# Patient Record
Sex: MALE | Race: WHITE | Employment: UNEMPLOYED | RURAL
[De-identification: names, ages, dates, MRNs, and addresses within clinical notes are randomized per-mention and may not be internally consistent; named-entity substitution may affect disease eponyms.]

---

## 2018-03-29 ENCOUNTER — HOSPITAL ENCOUNTER (OUTPATIENT)
Dept: OTHER | Age: 58
Discharge: HOME | End: 2018-03-29
Attending: NURSE PRACTITIONER | Admitting: NURSE PRACTITIONER

## 2018-03-29 DIAGNOSIS — R52 PAIN: ICD-10-CM

## 2018-04-03 ENCOUNTER — HOSPITAL ENCOUNTER (OUTPATIENT)
Dept: OTHER | Age: 58
Discharge: HOME | End: 2018-04-03
Attending: NURSE PRACTITIONER | Admitting: NURSE PRACTITIONER

## 2018-04-03 ENCOUNTER — HOSPITAL ENCOUNTER (OUTPATIENT)
Dept: GENERAL RADIOLOGY | Age: 58
Discharge: HOME | End: 2018-04-03

## 2018-04-03 DIAGNOSIS — Z87.891 PERSONAL HISTORY OF SMOKING: ICD-10-CM

## 2018-04-03 DIAGNOSIS — Z87.891 PERSONAL HISTORY OF NICOTINE DEPENDENCE: ICD-10-CM

## 2018-04-10 ENCOUNTER — HOSPITAL ENCOUNTER (OUTPATIENT)
Dept: OTHER | Age: 58
Discharge: HOME | End: 2018-04-30
Attending: NURSE PRACTITIONER | Admitting: NURSE PRACTITIONER

## 2018-04-10 ENCOUNTER — HOSPITAL ENCOUNTER (OUTPATIENT)
Dept: PHYSICAL THERAPY | Age: 58
Discharge: HOME | End: 2018-04-10
Attending: NURSE PRACTITIONER | Admitting: NURSE PRACTITIONER

## 2018-04-10 ASSESSMENT — PAIN DESCRIPTION - PAIN TYPE: TYPE: ACUTE PAIN;CHRONIC PAIN

## 2018-04-10 ASSESSMENT — PAIN DESCRIPTION - PROGRESSION: CLINICAL_PROGRESSION: GRADUALLY WORSENING

## 2018-04-10 ASSESSMENT — PAIN DESCRIPTION - ORIENTATION: ORIENTATION: RIGHT;LEFT

## 2018-04-10 ASSESSMENT — PAIN DESCRIPTION - LOCATION: LOCATION: BACK

## 2018-04-10 ASSESSMENT — PAIN DESCRIPTION - FREQUENCY: FREQUENCY: INTERMITTENT

## 2018-04-10 ASSESSMENT — PAIN SCALES - GENERAL: PAINLEVEL_OUTOF10: 3

## 2018-04-10 ASSESSMENT — PAIN DESCRIPTION - ONSET: ONSET: ON-GOING

## 2018-04-11 ASSESSMENT — PAIN DESCRIPTION - PROGRESSION: CLINICAL_PROGRESSION: GRADUALLY WORSENING

## 2018-04-12 ENCOUNTER — HOSPITAL ENCOUNTER (OUTPATIENT)
Dept: PHYSICAL THERAPY | Age: 58
Discharge: HOME | End: 2018-04-15
Admitting: NURSE PRACTITIONER

## 2018-04-17 ENCOUNTER — HOSPITAL ENCOUNTER (OUTPATIENT)
Dept: PHYSICAL THERAPY | Age: 58
Discharge: HOME | End: 2018-04-20
Admitting: NURSE PRACTITIONER

## 2018-04-19 ENCOUNTER — HOSPITAL ENCOUNTER (OUTPATIENT)
Dept: PHYSICAL THERAPY | Age: 58
Discharge: HOME | End: 2018-04-22
Admitting: NURSE PRACTITIONER

## 2018-05-01 ENCOUNTER — HOSPITAL ENCOUNTER (OUTPATIENT)
Dept: OTHER | Age: 58
Discharge: HOME | End: 2018-05-31
Attending: NURSE PRACTITIONER | Admitting: NURSE PRACTITIONER

## 2019-07-09 ENCOUNTER — HOSPITAL ENCOUNTER (OUTPATIENT)
Dept: GENERAL RADIOLOGY | Facility: HOSPITAL | Age: 59
Discharge: HOME | End: 2019-07-09
Payer: COMMERCIAL

## 2019-07-09 ENCOUNTER — HOSPITAL ENCOUNTER (OUTPATIENT)
Facility: HOSPITAL | Age: 59
Discharge: HOME | End: 2019-07-09
Payer: COMMERCIAL

## 2019-07-09 DIAGNOSIS — M25.572 LEFT ANKLE PAIN, UNSPECIFIED CHRONICITY: ICD-10-CM

## 2019-07-09 PROCEDURE — 73610 X-RAY EXAM OF ANKLE: CPT

## 2019-07-17 ENCOUNTER — HOSPITAL ENCOUNTER (OUTPATIENT)
Dept: CT IMAGING | Facility: HOSPITAL | Age: 59
Discharge: HOME | End: 2019-07-17
Payer: COMMERCIAL

## 2019-07-17 DIAGNOSIS — Z72.0 TOBACCO ABUSE: ICD-10-CM

## 2019-07-17 PROCEDURE — G0297 LDCT FOR LUNG CA SCREEN: HCPCS

## 2019-09-05 ENCOUNTER — HOSPITAL ENCOUNTER (OUTPATIENT)
Dept: GENERAL RADIOLOGY | Facility: HOSPITAL | Age: 59
Discharge: HOME | End: 2019-09-05
Payer: COMMERCIAL

## 2019-09-05 ENCOUNTER — HOSPITAL ENCOUNTER (OUTPATIENT)
Facility: HOSPITAL | Age: 59
Discharge: HOME | End: 2019-09-05
Payer: COMMERCIAL

## 2019-09-05 DIAGNOSIS — G89.29 CHRONIC PAIN OF RIGHT KNEE: ICD-10-CM

## 2019-09-05 DIAGNOSIS — M25.561 CHRONIC PAIN OF RIGHT KNEE: ICD-10-CM

## 2019-09-05 PROCEDURE — 73562 X-RAY EXAM OF KNEE 3: CPT

## 2020-01-30 ENCOUNTER — TELEPHONE (OUTPATIENT)
Dept: SURGERY | Age: 60
End: 2020-01-30

## 2020-02-03 ENCOUNTER — PROCEDURE VISIT (OUTPATIENT)
Dept: SURGERY | Age: 60
End: 2020-02-03
Payer: COMMERCIAL

## 2020-02-03 ENCOUNTER — OFFICE VISIT (OUTPATIENT)
Dept: SURGERY | Age: 60
End: 2020-02-03
Payer: COMMERCIAL

## 2020-02-03 VITALS
BODY MASS INDEX: 42.66 KG/M2 | SYSTOLIC BLOOD PRESSURE: 145 MMHG | HEIGHT: 72 IN | DIASTOLIC BLOOD PRESSURE: 84 MMHG | HEART RATE: 102 BPM | WEIGHT: 315 LBS | RESPIRATION RATE: 18 BRPM | TEMPERATURE: 97.9 F

## 2020-02-03 PROCEDURE — 99213 OFFICE O/P EST LOW 20 MIN: CPT

## 2020-02-03 PROCEDURE — 11402 EXC TR-EXT B9+MARG 1.1-2 CM: CPT | Performed by: SURGERY

## 2020-02-03 PROCEDURE — 12031 INTMD RPR S/A/T/EXT 2.5 CM/<: CPT | Performed by: SURGERY

## 2020-02-03 PROCEDURE — 99243 OFF/OP CNSLTJ NEW/EST LOW 30: CPT | Performed by: SURGERY

## 2020-02-03 PROCEDURE — 99203 OFFICE O/P NEW LOW 30 MIN: CPT

## 2020-02-03 RX ORDER — RANITIDINE 300 MG/1
300 CAPSULE ORAL EVERY EVENING
COMMUNITY

## 2020-02-03 RX ORDER — PRAVASTATIN SODIUM 20 MG
20 TABLET ORAL DAILY
COMMUNITY

## 2020-02-03 RX ORDER — POTASSIUM CHLORIDE 1.5 G/1.77G
20 POWDER, FOR SOLUTION ORAL DAILY
COMMUNITY

## 2020-02-03 RX ORDER — CELECOXIB 100 MG/1
100 CAPSULE ORAL 2 TIMES DAILY
COMMUNITY

## 2020-02-03 RX ORDER — CHLORTHALIDONE 25 MG/1
25 TABLET ORAL DAILY
COMMUNITY

## 2020-02-03 RX ORDER — LOSARTAN POTASSIUM 100 MG/1
100 TABLET ORAL DAILY
COMMUNITY

## 2020-02-03 RX ORDER — AMLODIPINE BESYLATE 10 MG/1
10 TABLET ORAL DAILY
COMMUNITY

## 2020-02-03 RX ORDER — TIZANIDINE 4 MG/1
4 TABLET ORAL 2 TIMES DAILY
COMMUNITY

## 2020-02-03 SDOH — HEALTH STABILITY: MENTAL HEALTH: HOW OFTEN DO YOU HAVE A DRINK CONTAINING ALCOHOL?: NEVER

## 2020-02-03 NOTE — PROGRESS NOTES
Konstantin Coleman was brought to operating suite and after appropriate consent was obtained from the patient. The right buttock was prepped and draped in normal sterile fashion. The 1.2cm skin lesion was circumferentially infiltrated with 1% xylocaine plain. After sufficient local anesthesia was obtained, the lesion was elliptically excised using 15 blade and submitted to pathology. Layer closure was performed using 3-0 vicryl interrupted horizontal mattress sutures for the subcutaneous tissue. Skin closure was performed using  3-0 nylon interrupted vertical mattress sutures. Sterile dressing was placed and the patient was instructed with respect to wound care and activities. The patient will return in one week for suture removal and path report.     Electronically signed by Dave Deluca MD on 2/3/2020 at 10:35 AM

## 2020-02-03 NOTE — PROGRESS NOTES
use.  DRUGS:   reports no history of drug use. MARITAL STATUS:    OCCUPATION:    Family History:       Problem Relation Age of Onset    Cancer Mother         breast    High Blood Pressure Mother     High Blood Pressure Father        REVIEW OF SYSTEMS:    CONSTITUTIONAL:  negative  EYES:  negative  HEENT:  negative  RESPIRATORY:  negative  CARDIOVASCULAR:  negative  GASTROINTESTINAL:  negative  GENITOURINARY:  negative  INTEGUMENT/BREAST:  negative except for above  HEMATOLOGIC/LYMPHATIC:  negative  ALLERGIC/IMMUNOLOGIC:  negative  ENDOCRINE:  negative  MUSCULOSKELETAL:  negative  NEUROLOGICAL:  negative  BEHAVIOR/PSYCH:  negative    PHYSICAL EXAM:    VITALS:  BP (!) 145/84 (Site: Left Upper Arm, Position: Sitting, Cuff Size: Large Adult)   Pulse 102   Temp 97.9 °F (36.6 °C) (Oral)   Resp 18   Ht 6' (1.829 m)   Wt (!) 352 lb 12.8 oz (160 kg)   BMI 47.85 kg/m²   CONSTITUTIONAL:  awake, alert, cooperative, no apparent distress, and appears stated age  SKIN:  Right buttock 1.2cm pedunculated skin lesion near buttock cleft without erythema or tenderness, no bruising or bleeding, normal skin color, texture, turgor and no redness, warmth, or swelling  DATA:    Old records have been requested      IMPRESSION/RECOMMENDATIONS:  Skin lesion right buttock    Plan: For excision in office under local anesthesia. Consult faxed to [facility], [address] [place] [address]; Thank you for the opportunity to participate in this patient's care.   Electronically signed by Johnny Ford MD on 2/3/2020 at 9:50 AM

## 2020-02-12 ENCOUNTER — OFFICE VISIT (OUTPATIENT)
Dept: SURGERY | Age: 60
End: 2020-02-12
Payer: COMMERCIAL

## 2020-02-12 VITALS
WEIGHT: 315 LBS | DIASTOLIC BLOOD PRESSURE: 74 MMHG | TEMPERATURE: 98.7 F | RESPIRATION RATE: 18 BRPM | HEIGHT: 72 IN | HEART RATE: 96 BPM | BODY MASS INDEX: 42.66 KG/M2 | SYSTOLIC BLOOD PRESSURE: 139 MMHG

## 2020-02-12 PROCEDURE — 99024 POSTOP FOLLOW-UP VISIT: CPT | Performed by: SURGERY

## 2020-02-12 NOTE — PROGRESS NOTES
Pt is coming in today to have his sutures removed after having a skin lesion excised last week. Pt states nothing has changed with his medications or history since he was here last and he had no complications after the procedure.

## 2020-02-12 NOTE — PROGRESS NOTES
The patient comes in for followup from excision of skin lesion . There have been no issues with inflammation or bleeding. Skin sutures were removed. Pathology report shows benign melanocytic nevus. The patient is to return to see me on an  as needed basis and will followup with PCP for any continuing medical problems.     Electronically signed by Frantz Chamberlain MD on 2/12/2020 at 11:41 AM

## 2020-09-04 ENCOUNTER — HOSPITAL ENCOUNTER (OUTPATIENT)
Dept: CT IMAGING | Facility: HOSPITAL | Age: 60
Discharge: HOME | End: 2020-09-04
Payer: COMMERCIAL

## 2020-09-04 PROCEDURE — G0297 LDCT FOR LUNG CA SCREEN: HCPCS

## 2021-03-04 ENCOUNTER — HOSPITAL ENCOUNTER (OUTPATIENT)
Dept: GENERAL RADIOLOGY | Facility: HOSPITAL | Age: 61
Discharge: HOME | End: 2021-03-04
Payer: COMMERCIAL

## 2021-03-04 ENCOUNTER — HOSPITAL ENCOUNTER (OUTPATIENT)
Facility: HOSPITAL | Age: 61
Discharge: HOME | End: 2021-03-04
Payer: COMMERCIAL

## 2021-03-04 DIAGNOSIS — G89.29 CHRONIC PAIN OF RIGHT KNEE: ICD-10-CM

## 2021-03-04 DIAGNOSIS — M25.561 CHRONIC PAIN OF RIGHT KNEE: ICD-10-CM

## 2021-03-04 PROCEDURE — 73562 X-RAY EXAM OF KNEE 3: CPT

## 2021-04-07 ENCOUNTER — HOSPITAL ENCOUNTER (OUTPATIENT)
Dept: PHYSICAL THERAPY | Facility: HOSPITAL | Age: 61
Setting detail: THERAPIES SERIES
Discharge: HOME | End: 2021-04-07
Payer: COMMERCIAL

## 2021-04-07 PROCEDURE — 97110 THERAPEUTIC EXERCISES: CPT

## 2021-04-07 PROCEDURE — 97140 MANUAL THERAPY 1/> REGIONS: CPT

## 2021-04-07 PROCEDURE — 97161 PT EVAL LOW COMPLEX 20 MIN: CPT

## 2021-04-07 NOTE — PROGRESS NOTES
Physical Therapy  Initial Assessment  Date: 2021  Patient Name: Elena Chin  MRN: [id number]  : 1960     Treatment Diagnosis: Right knee pain; patello-femoral pain; possible meniscus tear    Restrictions  Restrictions/Precautions  Restrictions/Precautions: General Precautions  Required Braces or Orthoses?: No    Subjective   General  Chart Reviewed: Yes  Patient assessed for rehabilitation services?: Yes  Response To Previous Treatment: Not applicable  Family / Caregiver Present: No  Referring Practitioner: Roly Head PA-C  Diagnosis: Right knee pain, possible MMT  Follows Commands: Within Functional Limits  PT Visit Information  PT Insurance Information: Passport  Subjective  Subjective: Patient reports: c/o localized right knee pain, vinicius-patella region; notes several year history of off/on knee pain; denies specific injury however has had a couple of episodes of \"popping\" when he squatted; denies edema; has occasional sensation that his knee will give way; overall pain is intermittent and seems to occur after prolonged sitting/immobilization; denies locking or catching; received a Cortisone shot at the orthopedic visit - seems to have helped a little  Pain Screening  Patient Currently in Pain: Denies  Vital Signs  Patient Currently in Pain: Denies    Vision/Hearing  Vision  Vision: Within Functional Limits  Hearing  Hearing: Within functional limits    Orientation  Orientation  Overall Orientation Status: Within Normal Limits    Social/Functional History  Social/Functional History  Lives With: Spouse  Ambulation Assistance: Independent  Transfer Assistance: Independent  Active : Yes  Mode of Transportation: Car  Type of occupation: Sedentary    Objective     Observation/Palpation  Posture: Good  Palpation: mild tenderness over distal IT band, (+) mild crepitus with flex, ext AROM  Observation: Gait is WNL, no limp; PTFJ intact    AROM RLE (degrees)  RLE AROM: WFL  RLE General AROM: Right knee: 0-115/120  AROM LLE (degrees)  LLE AROM : WFL    Strength RLE  R Hip Flexion: 4+/5  R Hip ABduction: 4+/5  R Hip ADduction: 4+/5  R Knee Flexion: 4/5  R Knee Extension: 4+/5  R Ankle Dorsiflexion: 5/5  Tone RLE  RLE Tone: Normotonic  Tone LLE  LLE Tone: Normotonic  Motor Control  Gross Motor?: WFL  Additional Measures  Special Tests: (-) ligament stability tests, (-) patella compression, (+) pain with deep flexion  Other: LEFS = 32/80  Sensation  Overall Sensation Status: WFL                   Exercises  Exercise 1: Begin Nustep: L6 x 8'  Exercise 2: seated heel slides - 2' x 2  Exercise 3: seated modified LAQ 90-30 degrees with 5#  Exercise 4: supine heel slides 3 x 20  Exercise 5: Manual: STM distal IT band, grade 1-2 mobs with PROM flexion  Exercise 6: Ice prn for pain to finish                      Assessment   Conditions Requiring Skilled Therapeutic Intervention  Body structures, Functions, Activity limitations: Increased pain;Decreased strength;Decreased high-level IADLs  Assessment: Right knee pain; patello-femoral pain; possible meniscus tear; will benefit from PT to help reduce/resolve pain and restore normal functional status. Treatment Diagnosis: Right knee pain; patello-femoral pain; possible meniscus tear  Prognosis: Excellent  Decision Making: Low Complexity  REQUIRES PT FOLLOW UP: Yes  Treatment Initiated : Evaluation, therex, HEP, Manual treatment  Activity Tolerance  Activity Tolerance: Patient Tolerated treatment well         Plan   Plan  Times per week: 2 x week  Plan weeks: 6 weeks  Current Treatment Recommendations: Strengthening, Neuromuscular Re-education, Home Exercise Program, ROM, Modalities, Manual Therapy - Soft Tissue Mobilization, Manual Therapy - Joint Manipulation, Stair training, ADL/Self-care Training               Goals  Short term goals  Time Frame for Short term goals: 1-2 weeks  Short term goal 1: Patient to be independent with HEP.   Short term goal 2: Patient to report a 20% decrease in right knee pain with ambulation and routine daily activities. Long term goals  Time Frame for Long term goals : 6 weeks  Long term goal 1: Patient to report a 90% decrease in right knee pain with ambulation and routine daily activities. Long term goal 2: Patient to achieve 5/5 right knee strength. Long term goal 3: Patient be able to ascend/descend 1 flight of stairs with one handrail and reported minimal difficulty. Long term goal 4: Patient achieve ability to ambulate up to 1/4-1/2 mile without exacerbation of right knee pain. Therapy Time   Individual Concurrent Group Co-treatment   Time In           Time Out           Minutes                   Silvestre Garzon PT       Certification of Medical Necessity: It will be understood that this treatment plan is certified medically necessary by the documenting therapist and referring physician mentioned in this report. Unless the physician indicated otherwise through written correspondence with our office, all further referrals will act as certification of medical necessity on this treatment plan. Thank you for this referral.  If you have questions regarding this plan of care, please call [phone].           Revisions to this plan (optional):                     Please sign and return this plan to:   FAX: [phone]      Signature:                                 Date:

## 2021-04-09 ENCOUNTER — HOSPITAL ENCOUNTER (OUTPATIENT)
Dept: PHYSICAL THERAPY | Facility: HOSPITAL | Age: 61
Setting detail: THERAPIES SERIES
Discharge: HOME | End: 2021-04-09
Payer: COMMERCIAL

## 2021-04-09 PROCEDURE — 97110 THERAPEUTIC EXERCISES: CPT

## 2021-04-09 PROCEDURE — 97140 MANUAL THERAPY 1/> REGIONS: CPT

## 2021-04-12 ENCOUNTER — HOSPITAL ENCOUNTER (OUTPATIENT)
Dept: PHYSICAL THERAPY | Facility: HOSPITAL | Age: 61
Setting detail: THERAPIES SERIES
Discharge: HOME | End: 2021-04-12
Payer: COMMERCIAL

## 2021-04-12 PROCEDURE — 97140 MANUAL THERAPY 1/> REGIONS: CPT

## 2021-04-12 PROCEDURE — 97110 THERAPEUTIC EXERCISES: CPT

## 2021-04-12 NOTE — FLOWSHEET NOTE
Physical Therapy Daily Treatment Note   Date:  2021    TIme In:   08                  Time Out:  [address]    Patient Name:  Charles Mccoy  \"Lorenzo\"  :  1960  MRN: [id number]    Restrictions/Precautions:    Pertinent Medical History:  Medical/Treatment Diagnosis Information:  ·   Right knee pain; patello-femoral pain; possible meniscus tear     Insurance/Certification information:    Passport  Physician Information:    Jeniffer Oswald PA-C  Plan of care signed (Y/N):    Visit# / total visits:   3/    G-Code (if applicable):      Date / Visit # G-Code Applied:         Progress Note: []  Yes  [x]  No  Next due by: Visit #10      Pain level:   0/10    Subjective: Pt reports his knee hurt pretty bad yesterday and he wasn't able to ride in his vehicle. Objective:   Observation:    Test measurements:     Palpation:    Exercises:  Exercise Resistance/Repetitions Other comments   Nustep L6 x 8' 12   Seated heel slides 2' x 2 12   Seated modified LAQ's: 90-30 ROM 5# - 2' x 3 12   Seated hamstring curls BTB - 3 x 10 12   Seated HR/TR 3x10 12   QS 3x10 12                                   Other Therapeutic Activities:      Manual Treatments:   STM distal IT band, grade 1-2 mobs with PROM flexion x 15'    Modalities: Ice prn for pain to finish x 10' today. Timed Code Treatment Minutes: 40      Total Treatment Minutes:  54    Treatment/Activity Tolerance:  [x] Patient tolerated treatment well [] Patient limited by fatigue  [] Patient limited by pain  [] Patient limited by other medical complications  [x] Other: Pt completed new activity well and had no pain after todays session.     Pain after treatment:     0/10    Prognosis: [x] Good [] Fair  [] Poor    Patient Requires Follow-up: [x] Yes  [] No    Plan:   [x] Continue per plan of care [] Alter current plan (see comments)  [] Plan of care initiated [] Hold pending MD visit [] Discharge    Plan for Next Session:        Electronically signed by:  Didi Bolus Day, PTA

## 2021-04-14 ENCOUNTER — HOSPITAL ENCOUNTER (OUTPATIENT)
Dept: PHYSICAL THERAPY | Facility: HOSPITAL | Age: 61
Setting detail: THERAPIES SERIES
Discharge: HOME | End: 2021-04-14
Payer: COMMERCIAL

## 2021-04-14 PROCEDURE — 97110 THERAPEUTIC EXERCISES: CPT

## 2021-04-14 PROCEDURE — 97140 MANUAL THERAPY 1/> REGIONS: CPT

## 2021-04-14 NOTE — FLOWSHEET NOTE
Physical Therapy Daily Treatment Note   Date:  2021    TIme In:   0830                  Time Out:  1510    Patient Name:  Gwenette Schlatter  \"Lorenzo\"  :  1960  MRN: [id number]    Restrictions/Precautions:    Pertinent Medical History:  Medical/Treatment Diagnosis Information:  ·   Right knee pain; patello-femoral pain; possible meniscus tear     Insurance/Certification information:    Passport  Physician Information:    Venus Khan PA-C  Plan of care signed (Y/N):    Visit# / total visits:   4/    G-Code (if applicable):      Date / Visit # G-Code Applied:         Progress Note: []  Yes  [x]  No  Next due by: Visit #10      Pain level:   0/10    Subjective: Pt reported no new comments today. Objective:   Observation:    Test measurements:     Palpation:    Exercises:  Exercise Resistance/Repetitions Other comments   Nustep L6 x 8' 14   Seated heel slides 2' x 2 14   Seated modified LAQ's: 90-30 ROM 5# - 2' x 3 14   Seated hamstring curls BTB - 3 x 10 14   Seated HR/TR 3x10 14   QS 3x10 14                                   Other Therapeutic Activities:      Manual Treatments:   STM distal IT band, grade 1-2 mobs with PROM flexion x 15'    Modalities:        Timed Code Treatment Minutes: 39      Total Treatment Minutes:  42    Treatment/Activity Tolerance:  [x] Patient tolerated treatment well [] Patient limited by fatigue  [] Patient limited by pain  [] Patient limited by other medical complications  [] Other:   Pain after treatment:     0/10    Prognosis: [x] Good [] Fair  [] Poor    Patient Requires Follow-up: [x] Yes  [] No    Plan:   [x] Continue per plan of care [] Alter current plan (see comments)  [] Plan of care initiated [] Hold pending MD visit [] Discharge    Plan for Next Session:        Electronically signed by:   Kellen Garrido PTA

## 2021-04-19 ENCOUNTER — HOSPITAL ENCOUNTER (OUTPATIENT)
Dept: PHYSICAL THERAPY | Facility: HOSPITAL | Age: 61
Setting detail: THERAPIES SERIES
Discharge: HOME | End: 2021-04-19
Payer: COMMERCIAL

## 2021-04-19 PROCEDURE — 97110 THERAPEUTIC EXERCISES: CPT

## 2021-04-19 PROCEDURE — 97140 MANUAL THERAPY 1/> REGIONS: CPT

## 2021-04-19 NOTE — FLOWSHEET NOTE
Physical Therapy Daily Treatment Note   Date:  2021    TIme In:   3181                  Time Out:  [address]    Patient Name:  Obed Marshall  \"Lorenzo\"  :  1960  MRN: [id number]    Restrictions/Precautions:    Pertinent Medical History:  Medical/Treatment Diagnosis Information:  ·   Right knee pain; patello-femoral pain; possible meniscus tear     Insurance/Certification information:    Passport  Physician Information:    Zenaida Guerra PA-C  Plan of care signed (Y/N):    Visit# / total visits:   5 /    G-Code (if applicable):      Date / Visit # G-Code Applied:         Progress Note: []  Yes  [x]  No  Next due by: Visit #10      Pain level:   0/10    Subjective:   Patient reports: knee if feeling some better, less pain overall; still has pain with driving     Objective:   Observation:    Test measurements:     Palpation:    Exercises:  Exercise Resistance/Repetitions Other comments   Nustep L6 x 8' 19   Seated heel slides 2' x 2 19   Seated modified LAQ's: 90-30 ROM 5# - 2' x 3 19   Seated hamstring curls BTB - 3 x 10 19   Seated HR/TR 3x10 19   QS 3x10 19        SLR: flex * 3 x 10 19   SAQ's * 3 x 10 19                    Other Therapeutic Activities:      Manual Treatments:   STM distal IT band, grade 1-2 mobs with PROM flexion x 12'    Modalities:        Timed Code Treatment Minutes: 39'      Total Treatment Minutes:  52'    Treatment/Activity Tolerance:  [x] Patient tolerated treatment well [] Patient limited by fatigue  [] Patient limited by pain  [] Patient limited by other medical complications  [] Other:   Pain after treatment:     0/10    Prognosis: [x] Good [] Fair  [] Poor    Patient Requires Follow-up: [x] Yes  [] No    Plan:   [x] Continue per plan of care [] Alter current plan (see comments)  [] Plan of care initiated [] Hold pending MD visit [] Discharge    Plan for Next Session:        Electronically signed by:  Jacoby Ha PT

## 2021-04-20 ENCOUNTER — HOSPITAL ENCOUNTER (OUTPATIENT)
Dept: MRI IMAGING | Facility: HOSPITAL | Age: 61
Discharge: HOME | End: 2021-04-20
Payer: COMMERCIAL

## 2021-04-20 DIAGNOSIS — M23.231 DERANG OF MEDIAL MENISCUS DUE TO OLD TEAR/INJ, RIGHT KNEE: ICD-10-CM

## 2021-04-21 ENCOUNTER — HOSPITAL ENCOUNTER (OUTPATIENT)
Dept: PHYSICAL THERAPY | Facility: HOSPITAL | Age: 61
Setting detail: THERAPIES SERIES
Discharge: HOME | End: 2021-04-21
Payer: COMMERCIAL

## 2021-04-21 PROCEDURE — 97140 MANUAL THERAPY 1/> REGIONS: CPT

## 2021-04-21 PROCEDURE — 97110 THERAPEUTIC EXERCISES: CPT

## 2021-04-21 NOTE — FLOWSHEET NOTE
Physical Therapy Daily Treatment Note   Date:  2021    TIme In:              821       Time Out:  906    Patient Name:  Kobe Hernandez  \"Lorenzo\"  :  1960  MRN: [id number]    Restrictions/Precautions:    Pertinent Medical History:  Medical/Treatment Diagnosis Information:  ·   Right knee pain; patello-femoral pain; possible meniscus tear     Insurance/Certification information:    Passport  Physician Information:    Norman Fraser PA-C  Plan of care signed (Y/N):    Visit# / total visits:   6 /    G-Code (if applicable):      Date / Visit # G-Code Applied:         Progress Note: []  Yes  [x]  No  Next due by: Visit #10      Pain level:   0/10    Subjective:   Patient reported no pain, but a couple days ago when he was weed eating  His right knee really hurt him.       Objective:   Observation:    Test measurements:     Palpation:    Exercises:  Exercise Resistance/Repetitions Other comments   Nustep L6 x 8' 21   Seated heel slides 2' x 2 21   Seated modified LAQ's: 90-30 ROM 5# - 2' x 3 21   Seated hamstring curls BTB - 3 x 10 21   Seated HR/TR 3x10 21   QS 3x10 21        SLR: flex * 3 x 10 21   SAQ's * 3 x 10 21   Seated marching * 2 x 10 21   Mini squats * 2 x 10 21   Standing heel/toe raises * 2 x10 21     Other Therapeutic Activities:      Manual Treatments:   STM distal IT band, grade 1-2 mobs with PROM flexion x 8'    Modalities:        Timed Code Treatment Minutes: 46      Total Treatment Minutes:  46    Treatment/Activity Tolerance:  [x] Patient tolerated treatment well [] Patient limited by fatigue  [] Patient limited by pain  [] Patient limited by other medical complications  [] Other:   Pain after treatment:     0/10    Prognosis: [x] Good [] Fair  [] Poor    Patient Requires Follow-up: [x] Yes  [] No    Plan:   [x] Continue per plan of care [] Alter current plan (see comments)  [] Plan of care initiated [] Hold pending MD visit [] Discharge    Plan for Next Session: Electronically signed by:   Beny Toldeo, PTA

## 2021-04-26 ENCOUNTER — HOSPITAL ENCOUNTER (OUTPATIENT)
Dept: PHYSICAL THERAPY | Facility: HOSPITAL | Age: 61
Setting detail: THERAPIES SERIES
Discharge: HOME | End: 2021-04-26
Payer: COMMERCIAL

## 2021-04-26 PROCEDURE — 97110 THERAPEUTIC EXERCISES: CPT

## 2021-04-26 PROCEDURE — 97140 MANUAL THERAPY 1/> REGIONS: CPT

## 2021-04-26 NOTE — FLOWSHEET NOTE
No    Plan:   [x] Continue per plan of care [] Alter current plan (see comments)  [] Plan of care initiated [] Hold pending MD visit [] Discharge    Plan for Next Session:        Electronically signed by:  Aayush Dunaway PTA

## 2021-04-28 ENCOUNTER — HOSPITAL ENCOUNTER (OUTPATIENT)
Dept: PHYSICAL THERAPY | Facility: HOSPITAL | Age: 61
Setting detail: THERAPIES SERIES
Discharge: HOME | End: 2021-04-28
Payer: COMMERCIAL

## 2021-04-28 PROCEDURE — 97110 THERAPEUTIC EXERCISES: CPT

## 2021-04-28 PROCEDURE — 97140 MANUAL THERAPY 1/> REGIONS: CPT

## 2021-04-28 NOTE — FLOWSHEET NOTE
Physical Therapy Daily Treatment Note   Date:  2021    TIme In:  0830         Time Out:  8346    Patient Name:  Stephanie Blue  \"Lorenzo\"  :  1960  MRN: [id number]    Restrictions/Precautions:    Pertinent Medical History:  Medical/Treatment Diagnosis Information:  ·   Right knee pain; patello-femoral pain; possible meniscus tear     Insurance/Certification information:    Passport  Physician Information:    Marc Hawkins PA-C  Plan of care signed (Y/N):    Visit# / total visits:   8 /    G-Code (if applicable):      Date / Visit # G-Code Applied:         Progress Note: []  Yes  [x]  No  Next due by: Visit #10      Pain level:   0/10    Subjective:   Patient reports: knee feels ok this morning, but still having pain with prolonged sitting, driving. Objective:   Observation:    Test measurements:     Palpation:    Exercises:  Exercise Resistance/Repetitions Other comments   Nustep L6 x 8' 28   Seated heel slides 2' x 2 28   Seated modified LAQ's: 90-30 ROM 5# - 2' x 3 28   Seated hamstring curls BTB - 3 x 10 28        QS 3x10 28        SLR: flex 3 x 10 28   SAQ's 3 x 10 28   Seated marching 2 x 10 28   Mini squats 2 x 10 28             Standing heel/toe raises 2 x10 28     Other Therapeutic Activities:      Manual Treatments:   STM distal IT band, grade 1-2 mobs with PROM flexion x 8'    Modalities:        Timed Code Treatment Minutes:   48'      Total Treatment Minutes:  62'    Treatment/Activity Tolerance:  [x] Patient tolerated treatment well [] Patient limited by fatigue  [] Patient limited by pain  [] Patient limited by other medical complications  [x] Other: Pt completed tx with no pain currently and no significant changes in function since starting PT. Pain after treatment:     0/10    Prognosis: [x] Good [] Fair  [] Poor    Goals  Short term goals  Time Frame for Short term goals: 1-2 weeks  Short term goal 1: Patient to be independent with HEP.   Short term goal 2: Patient to report a 20% decrease in right knee pain with ambulation and routine daily activities. Long term goals  Time Frame for Long term goals : 6 weeks  Long term goal 1: Patient to report a 90% decrease in right knee pain with ambulation and routine daily activities. Long term goal 2: Patient to achieve 5/5 right knee strength. Long term goal 3: Patient be able to ascend/descend 1 flight of stairs with one handrail and reported minimal difficulty. Long term goal 4: Patient achieve ability to ambulate up to 1/4-1/2 mile without exacerbation of right knee pain.         Patient Requires Follow-up: [x] Yes  [] No    Plan:   [x] Continue per plan of care [] Alter current plan (see comments)  [] Plan of care initiated [] Hold pending MD visit [] Discharge    Plan for Next Session:        Electronically signed by:  Christel Ledezma PT

## 2021-05-03 ENCOUNTER — HOSPITAL ENCOUNTER (OUTPATIENT)
Dept: PHYSICAL THERAPY | Facility: HOSPITAL | Age: 61
Setting detail: THERAPIES SERIES
Discharge: HOME | End: 2021-05-03
Payer: COMMERCIAL

## 2021-05-03 PROCEDURE — 97110 THERAPEUTIC EXERCISES: CPT

## 2021-05-03 PROCEDURE — 97140 MANUAL THERAPY 1/> REGIONS: CPT

## 2021-05-03 NOTE — FLOWSHEET NOTE
Physical Therapy Daily Treatment Note   Date:  5/3/2021    TIme In:     017    Time Out:  0902    Patient Name:  Natalie Coughlin  \"Lorenzo\"  :  1960  MRN: [id number]    Restrictions/Precautions:    Pertinent Medical History:  Medical/Treatment Diagnosis Information:  ·   Right knee pain; patello-femoral pain; possible meniscus tear     Insurance/Certification information:    Passport  Physician Information:    Tiana Mcgill PA-C  Plan of care signed (Y/N):    Visit# / total visits:   9/    G-Code (if applicable):      Date / Visit # G-Code Applied:         Progress Note: []  Yes  [x]  No  Next due by: Visit #10      Pain level:   0/10    Subjective:   Patient reports his knee still isn't any better and he goes for his MRI on Thursday. Objective:   Observation:    Test measurements:     Palpation:    Exercises:  Exercise Resistance/Repetitions Other comments   Nustep L6 x 8' 3   Seated heel slides 2' x 2 3   Seated modified LAQ's: 90-30 ROM 5# - 2' x 3 3   Seated hamstring curls BTB - 3 x 10 3        QS 3x10 3        SLR: flex 3 x 10 3   SAQ's 3 x 10 3   Seated marching 2 x 10 3   Mini squats 2 x 10 3             Standing heel/toe raises 2 x10 3     Other Therapeutic Activities:      Manual Treatments:   STM distal IT band, grade 1-2 mobs with PROM flexion x 8'    Modalities:        Timed Code Treatment Minutes:   40      Total Treatment Minutes: 41    Treatment/Activity Tolerance:  [x] Patient tolerated treatment well [] Patient limited by fatigue  [] Patient limited by pain  [] Patient limited by other medical complications  [x] Other: Pt completed tx with no pain and good PROM today in right knee. Pain after treatment:     0/10    Goals  Short term goals  Time Frame for Short term goals: 1-2 weeks  Short term goal 1: Patient to be independent with HEP. Short term goal 2: Patient to report a 20% decrease in right knee pain with ambulation and routine daily activities.   Long term goals  Time Frame for Long term goals : 6 weeks  Long term goal 1: Patient to report a 90% decrease in right knee pain with ambulation and routine daily activities. Long term goal 2: Patient to achieve 5/5 right knee strength. Long term goal 3: Patient be able to ascend/descend 1 flight of stairs with one handrail and reported minimal difficulty. Long term goal 4: Patient achieve ability to ambulate up to 1/4-1/2 mile without exacerbation of right knee pain.       Prognosis: [x] Good [] Fair  [] Poor    Goals  Short term goals  Time Frame for Short term goals: 1-2 weeks  Short term goal 1: Patient to be independent with HEP. Short term goal 2: Patient to report a 20% decrease in right knee pain with ambulation and routine daily activities. Long term goals  Time Frame for Long term goals : 6 weeks  Long term goal 1: Patient to report a 90% decrease in right knee pain with ambulation and routine daily activities. Long term goal 2: Patient to achieve 5/5 right knee strength. Long term goal 3: Patient be able to ascend/descend 1 flight of stairs with one handrail and reported minimal difficulty. Long term goal 4: Patient achieve ability to ambulate up to 1/4-1/2 mile without exacerbation of right knee pain.         Patient Requires Follow-up: [x] Yes  [] No    Plan:   [x] Continue per plan of care [] Alter current plan (see comments)  [] Plan of care initiated [] Hold pending MD visit [] Discharge    Plan for Next Session:        Electronically signed by:  Daisy Dunaway PTA

## 2021-05-05 ENCOUNTER — HOSPITAL ENCOUNTER (OUTPATIENT)
Dept: PHYSICAL THERAPY | Facility: HOSPITAL | Age: 61
Setting detail: THERAPIES SERIES
Discharge: HOME | End: 2021-05-05
Payer: COMMERCIAL

## 2021-05-05 PROCEDURE — 97140 MANUAL THERAPY 1/> REGIONS: CPT

## 2021-05-05 PROCEDURE — 97110 THERAPEUTIC EXERCISES: CPT

## 2021-06-01 ENCOUNTER — HOSPITAL ENCOUNTER (OUTPATIENT)
Dept: GENERAL RADIOLOGY | Facility: HOSPITAL | Age: 61
Discharge: HOME | End: 2021-06-01
Payer: COMMERCIAL

## 2021-06-01 ENCOUNTER — HOSPITAL ENCOUNTER (OUTPATIENT)
Facility: HOSPITAL | Age: 61
Discharge: HOME | End: 2021-06-01
Payer: COMMERCIAL

## 2021-06-01 DIAGNOSIS — R05.9 COUGH: ICD-10-CM

## 2021-06-01 PROCEDURE — 71046 X-RAY EXAM CHEST 2 VIEWS: CPT

## 2021-06-22 ENCOUNTER — HOSPITAL ENCOUNTER (OUTPATIENT)
Dept: PHYSICAL THERAPY | Facility: HOSPITAL | Age: 61
Setting detail: THERAPIES SERIES
Discharge: HOME | End: 2021-06-22
Payer: COMMERCIAL

## 2021-06-22 PROCEDURE — 97110 THERAPEUTIC EXERCISES: CPT

## 2021-06-22 PROCEDURE — 97161 PT EVAL LOW COMPLEX 20 MIN: CPT

## 2021-06-22 PROCEDURE — 97140 MANUAL THERAPY 1/> REGIONS: CPT

## 2021-06-22 NOTE — PROGRESS NOTES
Physical Therapy  Initial Assessment  Date: 2021  Patient Name: Aric Pham  MRN: [id number]  : 1960     Treatment Diagnosis: s/p right knee arthroscopy; joint stiffness; abnormality of gait    Restrictions  Restrictions/Precautions  Restrictions/Precautions: General Precautions, Surgical Protocols  Required Braces or Orthoses?: No    Subjective   General  Chart Reviewed: Yes  Response To Previous Treatment: Not applicable  Family / Caregiver Present: No  Referring Practitioner: Pascual Durbin PA-C  Diagnosis: s/p right knee ATS, MMR  PT Visit Information  PT Insurance Information: Passport  Subjective  Subjective: Patient reports: has a significant history of right knee injury with persistent pain; failed conservative treatment; underwent ATS as noted above on 6- -- \"think he fixed a tear\";  has minimal intermittent pain, has been performing light ROM exercises and walking at home; used crutches x 1 day; denies any known restrictions, denies calf pain  Pain Screening  Patient Currently in Pain: Denies  Vital Signs  Patient Currently in Pain: Denies    Vision/Hearing  Vision  Vision: Within Functional Limits  Hearing  Hearing: Within functional limits    Orientation  Orientation  Overall Orientation Status: Within Normal Limits    Social/Functional History       Objective     Observation/Palpation  Posture: Good  Palpation: mild tenderness vinicius-incision sites; calf is supple and non-tender  Observation: Gait: mild antalgic on right: right knee: trace-mild edema; scope sites are well healed, no errythema; PTFJ intact    AROM RLE (degrees)  RLE General AROM: AROM right knee: 0-110  AROM LLE (degrees)  LLE General AROM: AROM left knee: 0-115    Strength RLE  R Hip Flexion: 4+/5  R Hip ABduction: 4+/5  R Hip ADduction: 4+/5  R Knee Flexion: 4+/5  R Knee Extension: 4+/5  R Ankle Dorsiflexion: 5/5  Tone RLE  RLE Tone: Normotonic  Tone LLE  LLE Tone: Normotonic  Motor Control  Gross Motor?: on even and uneven surfaces. Long term goal 2: Achieve 5/5 right hip and knee strength in MMG's. Long term goal 3: Patient to achieve ability to perform ADL's and I-ADL's with 0-1/10 and reported none to minimal difficulty. Long term goal 4: Patient to achieve ability to ascend/descend 1-2 flights of stairs with 0-1/10 pain and reported none to minimal difficulty. Long term goal 5: Patient to achieve ability to ambulate community distances and terrains with 0-1/10 pain and reported none to minimal difficulty. Therapy Time   Individual Concurrent Group Co-treatment   Time In           Time Out           Minutes                   Samuel Shepherd PT     Certification of Medical Necessity: It will be understood that this treatment plan is certified medically necessary by the documenting therapist and referring physician mentioned in this report. Unless the physician indicated otherwise through written correspondence with our office, all further referrals will act as certification of medical necessity on this treatment plan. Thank you for this referral.  If you have questions regarding this plan of care, please call [phone].           Revisions to this plan (optional):                     Please sign and return this plan to:   FAX: [phone]      Signature:                                 Date:

## 2021-06-24 ENCOUNTER — HOSPITAL ENCOUNTER (OUTPATIENT)
Dept: PHYSICAL THERAPY | Facility: HOSPITAL | Age: 61
Setting detail: THERAPIES SERIES
Discharge: HOME | End: 2021-06-24
Payer: COMMERCIAL

## 2021-06-24 PROCEDURE — 97140 MANUAL THERAPY 1/> REGIONS: CPT

## 2021-06-24 PROCEDURE — 97110 THERAPEUTIC EXERCISES: CPT

## 2021-06-29 ENCOUNTER — HOSPITAL ENCOUNTER (OUTPATIENT)
Dept: PHYSICAL THERAPY | Facility: HOSPITAL | Age: 61
Setting detail: THERAPIES SERIES
Discharge: HOME | End: 2021-06-29
Payer: COMMERCIAL

## 2021-06-29 PROCEDURE — 97140 MANUAL THERAPY 1/> REGIONS: CPT

## 2021-06-29 PROCEDURE — 97110 THERAPEUTIC EXERCISES: CPT

## 2021-07-01 ENCOUNTER — HOSPITAL ENCOUNTER (OUTPATIENT)
Dept: PHYSICAL THERAPY | Facility: HOSPITAL | Age: 61
Setting detail: THERAPIES SERIES
Discharge: HOME | End: 2021-07-01
Payer: COMMERCIAL

## 2021-07-01 PROCEDURE — 97140 MANUAL THERAPY 1/> REGIONS: CPT

## 2021-07-01 PROCEDURE — 97110 THERAPEUTIC EXERCISES: CPT

## 2021-07-01 NOTE — FLOWSHEET NOTE
Physical Therapy Daily Treatment Note   Date:  2021    TIme In:     08                 Time Out:   09    Patient Name:  Randy Robles    :  1960  MRN: [id number]    Restrictions/Precautions:    Pertinent Medical History:  Medical/Treatment Diagnosis Information:  ·   s/p right knee arthroscopy; joint stiffness; abnormality of gait     Insurance/Certification information:    Passport  Physician Information:    Jitendra Tovar PA-C  Plan of care signed (Y/N):    Visit# / total visits:   5  /    G-Code (if applicable):      Date / Visit # G-Code Applied:         Progress Note: []  Yes  [x]  No  Next due by: Visit #10      Pain level: 0  /10    Subjective:   Knee feels ok; no pain c/o; still not doing as much physical activity while the knee is healing. Objective:   Observation:    Test measurements:     Palpation:    Exercises:  Exercise Resistance/Repetitions Other comments   Nustep L5 x 8' 1   Standing marching 30\" x 7 1   Standing hip ABD - bilateral 3 x 10 1   Standing heel-toe raises 3 x 10 1   Gentle slant board stretch 10\" x 5 1   Supine/incline heel slides 1' x 3 1   Quad sets 2 x 20 1   SLR- flexion 3 x 10 1                         Other Therapeutic Activities:      Manual Treatments:  Right knee: patella mobs, gentle STM vinicius-patella, PROM x 10'    Modalities:   Ice pack x 12' to finish - PRN      Timed Code Treatment Minutes:  38'      Total Treatment Minutes:  36'    Treatment/Activity Tolerance:  [x] Patient tolerated treatment well [] Patient limited by fatigue  [] Patient limited by pain  [] Patient limited by other medical complications  [] Other:     Pain after treatment:     0 /10    Prognosis: [x] Good [] Fair  [] Poor    Patient Requires Follow-up: [x] Yes  [] No    Plan:   [x] Continue per plan of care [] Alter current plan (see comments)  [] Plan of care initiated [] Hold pending MD visit [] Discharge    Plan for Next Session:        Electronically signed by: Jeoffrey Angelucci, PT

## 2021-07-06 ENCOUNTER — HOSPITAL ENCOUNTER (OUTPATIENT)
Dept: PHYSICAL THERAPY | Facility: HOSPITAL | Age: 61
Setting detail: THERAPIES SERIES
Discharge: HOME | End: 2021-07-06
Payer: COMMERCIAL

## 2021-07-06 PROCEDURE — 97110 THERAPEUTIC EXERCISES: CPT

## 2021-07-06 PROCEDURE — 97140 MANUAL THERAPY 1/> REGIONS: CPT

## 2021-07-06 NOTE — FLOWSHEET NOTE
Physical Therapy Daily Treatment Note   Date:  2021    TIme In:    818                Time Out:       Patient Name:  Julia Olsen    :  1960  MRN: [id number]    Restrictions/Precautions:    Pertinent Medical History:  Medical/Treatment Diagnosis Information:  ·   s/p right knee arthroscopy; joint stiffness; abnormality of gait     Insurance/Certification information:    Passport  Physician Information:    Jimmie Michaels PA-C  Plan of care signed (Y/N):    Visit# / total visits:   6 /    G-Code (if applicable):      Date / Visit # G-Code Applied:         Progress Note: []  Yes  [x]  No  Next due by: Visit #10      Pain level: 0/10    Subjective:   Pt reports his knee has gotten a lot better and he doesn't have any issues. He expressed that he hasn't done any long distance driving so it hasn't been tested. Objective:   Observation:    Test measurements:     Palpation:    Exercises:  Exercise Resistance/Repetitions Other comments   Nustep L5 x 8' 6   Standing marching 30\" x 7 6   Standing hip ABD - bilateral 3 x 10 6   Standing heel-toe raises 3 x 10 6   Gentle slant board stretch 10\" x 5 6   Supine/incline heel slides 1' x 3 6   Quad sets 2 x 20 6   SLR- flexion 3 x 10 6                         Other Therapeutic Activities:      Manual Treatments:  Right knee: patella mobs, gentle STM vinicius-patella, PROM x 10'    Modalities: Ice pack x 12' to finish - PRN. Not today per pt request.      Timed Code Treatment Minutes: 35      Total Treatment Minutes:  37    Treatment/Activity Tolerance:  [x] Patient tolerated treatment well [] Patient limited by fatigue  [] Patient limited by pain  [] Patient limited by other medical complications  [x] Other: Pt completed tx with no pain and good ROM sitting EOB.     Pain after treatment:      0/10    Prognosis: [x] Good [] Fair  [] Poor    Patient Requires Follow-up: [x] Yes  [] No    Plan:   [x] Continue per plan of care [] Alter current plan (see comments)  [] Plan of care initiated [] Hold pending MD visit [] Discharge    Plan for Next Session:        Electronically signed by:  Esthela Dunaway PTA

## 2021-07-08 ENCOUNTER — HOSPITAL ENCOUNTER (OUTPATIENT)
Dept: PHYSICAL THERAPY | Facility: HOSPITAL | Age: 61
Setting detail: THERAPIES SERIES
Discharge: HOME | End: 2021-07-08
Payer: COMMERCIAL

## 2021-07-08 PROCEDURE — 97110 THERAPEUTIC EXERCISES: CPT

## 2021-07-08 PROCEDURE — 97140 MANUAL THERAPY 1/> REGIONS: CPT

## 2021-07-08 NOTE — FLOWSHEET NOTE
Physical Therapy Daily Treatment Note   Date:  2021    TIme In:    830                Time Out:  523    Patient Name:  Cynthia Mosquera    :  1960  MRN: [id number]    Restrictions/Precautions:    Pertinent Medical History:  Medical/Treatment Diagnosis Information:  ·   s/p right knee arthroscopy; joint stiffness; abnormality of gait     Insurance/Certification information:    Passport  Physician Information:    Keya Haynes PA-C  Plan of care signed (Y/N):    Visit# / total visits:   7 /    G-Code (if applicable):      Date / Visit # G-Code Applied:         Progress Note: []  Yes  [x]  No  Next due by: Visit #10      Pain level: 0/10    Subjective:   Pt reports his knee is doing really good and he isn't having any issues. Objective:   Observation:    Test measurements:     Palpation:    Exercises:  Exercise Resistance/Repetitions Other comments   Nustep L6x 8' 8   Step ups: fwd, sideways 2x10 8   Sidestepping 2 laps 8   Standing heel-toe raises 3 x 10 8   Gentle slant board stretch 10\" x 5 8   Sitting heel slides 1' x 3 8   TKE 2x15 GTB 8   Lunge to step 2x10 8   Ball squeeze 2x15 8   LAQ 2x15 3# 8   HS curls 2x10 GTB 8          Other Therapeutic Activities:      Manual Treatments:  Right knee: patella mobs, gentle STM vinicius-patella, PROM x 10'    Modalities: Ice pack x 12' to finish - PRN. Not today per pt request.      Timed Code Treatment Minutes: 40      Total Treatment Minutes:  44    Treatment/Activity Tolerance:  [x] Patient tolerated treatment well [] Patient limited by fatigue  [] Patient limited by pain  [] Patient limited by other medical complications  [x] Other: Pt completed tx with no pain and good performance of all new activity.     Pain after treatment:     0 /10    Prognosis: [x] Good [] Fair  [] Poor    Patient Requires Follow-up: [x] Yes  [] No    Plan:   [x] Continue per plan of care [] Alter current plan (see comments)  [] Plan of care initiated [] Hold pending MD visit [] Discharge    Plan for Next Session:        Electronically signed by:  Pardeep Dunaway PTA

## 2021-07-13 ENCOUNTER — HOSPITAL ENCOUNTER (OUTPATIENT)
Dept: PHYSICAL THERAPY | Facility: HOSPITAL | Age: 61
Setting detail: THERAPIES SERIES
Discharge: HOME | End: 2021-07-13
Payer: COMMERCIAL

## 2021-07-13 PROCEDURE — 97110 THERAPEUTIC EXERCISES: CPT

## 2021-07-13 PROCEDURE — 97140 MANUAL THERAPY 1/> REGIONS: CPT

## 2021-07-13 NOTE — FLOWSHEET NOTE
Physical Therapy Daily Treatment Note   Date:  2021    TIme In:    827                Time Out:  Nicolasa    Patient Name:  Paul Martin    :  1960  MRN: [id number]    Restrictions/Precautions:    Pertinent Medical History:  Medical/Treatment Diagnosis Information:  ·   s/p right knee arthroscopy; joint stiffness; abnormality of gait     Insurance/Certification information:    Passport  Physician Information:    Jabier Arce PA-C  Plan of care signed (Y/N):    Visit# / total visits:   8 /    G-Code (if applicable):      Date / Visit # G-Code Applied:         Progress Note: []  Yes  [x]  No  Next due by: Visit #10      Pain level: 0/10    Subjective:   Pt reports his knee is doing really good and he isn't having any issues; has MD f/u tomorrow. Objective:   Observation:    Test measurements:     Palpation:    Exercises:  Exercise Resistance/Repetitions Other comments   Nustep L6x 8' 13   Step ups: fwd, sideways 3x10 13   Sidestepping 3 laps 13   Standing heel-toe raises 3 x 10 13   Gentle slant board stretch 10\" x 5 13   Sitting heel slides 1' x 3 13   TKE 3x15 GTB 13   Lunge to step 3x10 13   Ball squeeze 3x15 13   LAQ 3x15 3# 13   HS curls 3x10 GTB 13                    Other Therapeutic Activities:      Manual Treatments:  Right knee: patella mobs, gentle STM vinicius-patella, PROM x 10' - performed by Valentino Mcdaniel, PT    Modalities: Ice pack x 12' to finish - PRN. Not today per pt request.      Timed Code Treatment Minutes:   48'      Total Treatment Minutes:  64'    Treatment/Activity Tolerance:  [x] Patient tolerated treatment well [] Patient limited by fatigue  [] Patient limited by pain  [] Patient limited by other medical complications  [x] Other: Pt completed tx with no pain and good performance of all new activity.     Pain after treatment:     0 /10    Prognosis: [x] Good [] Fair  [] Poor    Patient Requires Follow-up: [x] Yes  [] No    Plan:   [x] Continue per plan of care [] Alter current plan (see comments)  [] Plan of care initiated [] Hold pending MD visit [] Discharge    Plan for Next Session:        Electronically signed by:  Katy Taveras PT

## 2021-07-15 ENCOUNTER — HOSPITAL ENCOUNTER (OUTPATIENT)
Dept: PHYSICAL THERAPY | Facility: HOSPITAL | Age: 61
Setting detail: THERAPIES SERIES
Discharge: HOME | End: 2021-07-15
Payer: COMMERCIAL

## 2021-07-15 PROCEDURE — 97140 MANUAL THERAPY 1/> REGIONS: CPT

## 2021-07-15 PROCEDURE — 97110 THERAPEUTIC EXERCISES: CPT

## 2021-07-15 NOTE — FLOWSHEET NOTE
Physical Therapy Daily Treatment Note   Date:  7/15/2021    TIme In:    9521                Time Out:  [address]    Patient Name:  Elio Olsen    :  1960  MRN: [id number]    Restrictions/Precautions:    Pertinent Medical History:  Medical/Treatment Diagnosis Information:  ·   s/p right knee arthroscopy; joint stiffness; abnormality of gait     Insurance/Certification information:    Passport  Physician Information:    Zaki Henry PA-C  Plan of care signed (Y/N):    Visit# / total visits:   9 /    G-Code (if applicable):      Date / Visit # G-Code Applied:         Progress Note: []  Yes  [x]  No  Next due by: Visit #10      Pain level: 0/10    Subjective:   Pt reports: doing well; had MD f/u - continue per PT guidance     Objective:   Observation:    Test measurements:     Palpation:    Exercises:  Exercise Resistance/Repetitions Other comments   Nustep L6x 8' 15   Step ups: fwd, sideways 3x10 15   Sidestepping 3 laps 15   Standing heel-toe raises 3 x 10 15   Gentle slant board stretch 10\" x 5 15   Sitting heel slides 1' x 3 15   TKE 3x15 GTB 15   Lunge to step 3x10 15   Ball squeeze 3x15 15   LAQ 3x15 3# 15   HS curls 3x10 GTB 15                    Other Therapeutic Activities:      Manual Treatments:  Right knee: patella mobs, gentle STM vinicius-patella, PROM x 10'    Modalities: Ice pack x 12' to finish - PRN. Not today per pt request.      Timed Code Treatment Minutes:   50'      Total Treatment Minutes:  46'    Treatment/Activity Tolerance:  [x] Patient tolerated treatment well [] Patient limited by fatigue  [] Patient limited by pain  [] Patient limited by other medical complications  [x] Other: Pt completed tx with no pain and good performance of all new activity.     Pain after treatment:     0 /10    Prognosis: [x] Good [] Fair  [] Poor    Patient Requires Follow-up: [x] Yes  [] No    Plan:   [x] Continue per plan of care [] Alter current plan (see comments)  [] Plan of care initiated [] Hold pending MD visit [] Discharge    Plan for Next Session:        Electronically signed by:  Gildardo Oliveira PT

## 2021-07-20 ENCOUNTER — HOSPITAL ENCOUNTER (OUTPATIENT)
Dept: PHYSICAL THERAPY | Facility: HOSPITAL | Age: 61
Setting detail: THERAPIES SERIES
Discharge: HOME | End: 2021-07-20
Payer: COMMERCIAL

## 2021-07-20 PROCEDURE — 97140 MANUAL THERAPY 1/> REGIONS: CPT

## 2021-07-20 PROCEDURE — 97110 THERAPEUTIC EXERCISES: CPT

## 2021-07-20 NOTE — FLOWSHEET NOTE
Physical Therapy Daily Treatment Note   Date:  2021    TIme In:    08                Time Out:  [address]    Patient Name:  Bryan Condon    :  1960  MRN: [id number]    Restrictions/Precautions:    Pertinent Medical History:  Medical/Treatment Diagnosis Information:  ·   s/p right knee arthroscopy; joint stiffness; abnormality of gait     Insurance/Certification information:    Passport  Physician Information:    Lucia Councilman, PA-C  Plan of care signed (Y/N):    Visit# / total visits:   10 /    G-Code (if applicable):      Date / Visit # G-Code Applied:         Progress Note: []  Yes  [x]  No  Next due by: Visit #10      Pain level: 0/10    Subjective:   Pt reports his knee is doing really well and he drove to [place] the other day and didn't have any pain. Objective:   Observation:    Test measurements:     Palpation:    Exercises:  Exercise Resistance/Repetitions Other comments   Nustep L6x 8' 20   Step ups: fwd, sideways 3x10 20   Sidestepping 3 laps 20   Standing heel-toe raises 3 x 10 20   Gentle slant board stretch 10\" x 5 20   Sitting heel slides 1' x 3 20   TKE 3x15 GTB 20   Lunge to step 3x10 20   Ball squeeze 3x15 20   LAQ 3x15 3# 20   HS curls 3x10 GTB 20                    Other Therapeutic Activities:      Manual Treatments:  Right knee: patella mobs, gentle STM vinicius-patella, PROM x 10'    Modalities: Ice pack x 12' to finish - PRN. Not today per pt request.      Timed Code Treatment Minutes:   40      Total Treatment Minutes: 45    Treatment/Activity Tolerance:  [x] Patient tolerated treatment well [] Patient limited by fatigue  [] Patient limited by pain  [] Patient limited by other medical complications  [x] Other: Pt completed tx with no pain and will be discharged to [facility] next visit.     Pain after treatment:     0/10    Prognosis: [x] Good [] Fair  [] Poor    Patient Requires Follow-up: [x] Yes  [] No    Plan:   [x] Continue per plan of care [] Alter current plan (see comments)  [] Plan of care initiated [] Hold pending MD visit [] Discharge    Plan for Next Session:        Electronically signed by:  Mat Dunaway PTA

## 2021-07-22 ENCOUNTER — HOSPITAL ENCOUNTER (OUTPATIENT)
Dept: PHYSICAL THERAPY | Facility: HOSPITAL | Age: 61
Setting detail: THERAPIES SERIES
Discharge: HOME | End: 2021-07-22
Payer: COMMERCIAL

## 2021-07-22 PROCEDURE — 97110 THERAPEUTIC EXERCISES: CPT

## 2021-07-22 NOTE — FLOWSHEET NOTE
Physical Therapy Daily Treatment Note/Discharge Summary  Date:  2021    TIme In:   2791                Time Out:   9056    Patient Name:  March Serum    :  1960  MRN: [id number]    Restrictions/Precautions:    Pertinent Medical History:  Medical/Treatment Diagnosis Information:  ·   s/p right knee arthroscopy; joint stiffness; abnormality of gait     Insurance/Certification information:    Passport  Physician Information:    Dl Mireles PA-C  Plan of care signed (Y/N):    Visit# / total visits:   11 /    G-Code (if applicable):      Date / Visit # G-Code Applied:         Progress Note: []  Yes  [x]  No  Next due by: Visit #10      Pain level: 0/10    Subjective:   Pt reports his knee is doing really well and he is able to go up and down stairs now without pain. Objective:   Observation:    Test measurements:  LEFS: 74/80. R hip and knee MMT: 5/5 all planes.  Palpation:    Exercises:  Exercise Resistance/Repetitions Other comments   Nustep L6x 8' 22   Step ups: fwd, sideways 3x10 22   Sidestepping 3 laps 22   Standing heel-toe raises 3 x 10 22   Gentle slant board stretch 10\" x 5 22   Sitting heel slides 1' x 3 22   TKE 3x15 BTB 22   Lunge to step 3x10 22   Ball squeeze 3x15 22   LAQ 3x15 4# 22   HS curls 3x10 GTB 22                    Other Therapeutic Activities:      Manual Treatments:  Right knee: patella mobs, gentle STM vinicius-patella, PROM x 10'. Not today. Modalities: Ice pack x 12' to finish - PRN. Not today per pt request.      Timed Code Treatment Minutes:   30      Total Treatment Minutes: 35    Treatment/Activity Tolerance:  [x] Patient tolerated treatment well [] Patient limited by fatigue  [] Patient limited by pain  [] Patient limited by other medical complications  [x] Other: Pt completed tx with no pain and has met all goals.     Pain after treatment:     0/10    Goals  Short term goals  Time Frame for Short term goals: 1-2 weeks  Short term goal 1: Patient to be independent with [facility]. Short term goal 2: Achieve right knee AROM: 0-115. Short term goal 3: Achieve 5-/5 right hip and knee strength in the noted deficits. Long term goals  Time Frame for Long term goals : 6 weeks  Long term goal 1: Achieve a normal gait pattern on even and uneven surfaces. MET  Long term goal 2: Achieve 5/5 right hip and knee strength in MMG's. MET  Long term goal 3: Patient to achieve ability to perform ADL's and I-ADL's with 0-1/10 and reported none to minimal difficulty. MET  Long term goal 4: Patient to achieve ability to ascend/descend 1-2 flights of stairs with 0-1/10 pain and reported none to minimal difficulty. MET  Long term goal 5: Patient to achieve ability to ambulate community distances and terrains with 0-1/10 pain and reported none to minimal difficulty. MET       Prognosis: [x] Good [] Fair  [] Poor    Patient Requires Follow-up: [] Yes  [x] No    Plan:   [] Continue per plan of care [] Alter current plan (see comments)  [] Plan of care initiated [] Hold pending MD visit [x] Discharge    Plan for Next Session:   D/C to [facility].       Electronically signed by:  Francisco Javier Dunaway PTA

## 2021-07-27 ENCOUNTER — HOSPITAL ENCOUNTER (OUTPATIENT)
Dept: PHYSICAL THERAPY | Facility: HOSPITAL | Age: 61
Setting detail: THERAPIES SERIES
End: 2021-07-27
Payer: COMMERCIAL

## 2021-07-29 ENCOUNTER — APPOINTMENT (OUTPATIENT)
Dept: PHYSICAL THERAPY | Facility: HOSPITAL | Age: 61
End: 2021-07-29
Payer: COMMERCIAL

## 2021-08-30 ENCOUNTER — HOSPITAL ENCOUNTER (OUTPATIENT)
Dept: CT IMAGING | Facility: HOSPITAL | Age: 61
Discharge: HOME | End: 2021-08-30
Payer: COMMERCIAL

## 2021-08-30 DIAGNOSIS — F17.200 TOBACCO USE DISORDER: ICD-10-CM

## 2021-09-07 ENCOUNTER — HOSPITAL ENCOUNTER (OUTPATIENT)
Dept: CT IMAGING | Facility: HOSPITAL | Age: 61
Discharge: HOME | End: 2021-09-07
Payer: COMMERCIAL

## 2021-09-07 DIAGNOSIS — F17.200 TOBACCO USE DISORDER: ICD-10-CM

## 2021-09-07 PROCEDURE — 71271 CT THORAX LUNG CANCER SCR C-: CPT

## 2021-09-08 ENCOUNTER — HOSPITAL ENCOUNTER (OUTPATIENT)
Facility: HOSPITAL | Age: 61
Discharge: HOME | End: 2021-09-08
Payer: COMMERCIAL

## 2021-09-08 LAB
BASOPHILS ABSOLUTE: 0.1 K/UL (ref 0–0.1)
BASOPHILS RELATIVE PERCENT: 1.3 %
EOSINOPHILS ABSOLUTE: 0.2 K/UL (ref 0–0.4)
EOSINOPHILS RELATIVE PERCENT: 2.8 %
HCT VFR BLD CALC: 47.6 % (ref 40–54)
HEMOGLOBIN: 15.6 G/DL (ref 13–18)
IMMATURE GRANULOCYTES #: 0 K/UL
IMMATURE GRANULOCYTES %: 0.3 % (ref 0–5)
LYMPHOCYTES ABSOLUTE: 2.4 K/UL (ref 1.5–4)
LYMPHOCYTES RELATIVE PERCENT: 29.8 %
MCH RBC QN AUTO: 31.3 PG (ref 27–32)
MCHC RBC AUTO-ENTMCNC: 32.8 G/DL (ref 31–35)
MCV RBC AUTO: 95.6 FL (ref 80–100)
MONOCYTES ABSOLUTE: 0.6 K/UL (ref 0.2–0.8)
MONOCYTES RELATIVE PERCENT: 8.1 %
NEUTROPHILS ABSOLUTE: 4.6 K/UL (ref 2–7.5)
NEUTROPHILS RELATIVE PERCENT: 57.7 %
PDW BLD-RTO: 13.4 % (ref 11–16)
PLATELET # BLD: 160 K/UL (ref 150–400)
PMV BLD AUTO: 11.2 FL (ref 6–10)
RBC # BLD: 4.98 M/UL (ref 4.5–6)
WBC # BLD: 7.9 K/UL (ref 4–11)

## 2021-09-08 PROCEDURE — 85025 COMPLETE CBC W/AUTO DIFF WBC: CPT

## 2021-09-08 PROCEDURE — 36415 COLL VENOUS BLD VENIPUNCTURE: CPT

## 2022-01-05 ENCOUNTER — OFFICE VISIT (OUTPATIENT)
Dept: NEUROLOGY | Facility: CLINIC | Age: 62
End: 2022-01-05

## 2022-01-05 VITALS
SYSTOLIC BLOOD PRESSURE: 152 MMHG | HEART RATE: 82 BPM | HEIGHT: 72 IN | DIASTOLIC BLOOD PRESSURE: 78 MMHG | OXYGEN SATURATION: 97 % | TEMPERATURE: 97.3 F | WEIGHT: 315 LBS | BODY MASS INDEX: 42.66 KG/M2

## 2022-01-05 DIAGNOSIS — Z20.822 ENCOUNTER FOR PREPROCEDURE SCREENING LABORATORY TESTING FOR COVID-19: ICD-10-CM

## 2022-01-05 DIAGNOSIS — Z01.812 ENCOUNTER FOR PREPROCEDURE SCREENING LABORATORY TESTING FOR COVID-19: ICD-10-CM

## 2022-01-05 DIAGNOSIS — I10 ESSENTIAL HYPERTENSION: ICD-10-CM

## 2022-01-05 DIAGNOSIS — R06.83 SNORING: ICD-10-CM

## 2022-01-05 DIAGNOSIS — G47.9 RESTLESS SLEEPER: ICD-10-CM

## 2022-01-05 DIAGNOSIS — R41.82 ALTERED MENTAL STATUS, UNSPECIFIED ALTERED MENTAL STATUS TYPE: Primary | ICD-10-CM

## 2022-01-05 DIAGNOSIS — E78.5 DYSLIPIDEMIA: ICD-10-CM

## 2022-01-05 PROCEDURE — 99203 OFFICE O/P NEW LOW 30 MIN: CPT | Performed by: NURSE PRACTITIONER

## 2022-01-05 RX ORDER — FAMOTIDINE 40 MG/1
40 TABLET, FILM COATED ORAL DAILY
COMMUNITY

## 2022-01-05 RX ORDER — CELECOXIB 100 MG/1
100 CAPSULE ORAL 2 TIMES DAILY
COMMUNITY

## 2022-01-05 RX ORDER — TIZANIDINE 4 MG/1
4 TABLET ORAL 2 TIMES DAILY
COMMUNITY
Start: 2021-12-20

## 2022-01-05 RX ORDER — PRAVASTATIN SODIUM 40 MG
40 TABLET ORAL DAILY
COMMUNITY
Start: 2021-11-29

## 2022-01-05 RX ORDER — LOSARTAN POTASSIUM 100 MG/1
100 TABLET ORAL DAILY
COMMUNITY
Start: 2021-11-29

## 2022-01-05 RX ORDER — CHLORTHALIDONE 25 MG/1
25 TABLET ORAL DAILY
COMMUNITY
Start: 2021-11-29

## 2022-01-05 RX ORDER — ERGOCALCIFEROL 1.25 MG/1
1250 CAPSULE ORAL WEEKLY
COMMUNITY
Start: 2021-12-09

## 2022-01-05 RX ORDER — LORATADINE 10 MG/1
10 TABLET ORAL DAILY
COMMUNITY
Start: 2021-12-10

## 2022-01-05 RX ORDER — TAMSULOSIN HYDROCHLORIDE 0.4 MG/1
0.4 CAPSULE ORAL DAILY
COMMUNITY
Start: 2021-11-29

## 2022-01-05 RX ORDER — CARVEDILOL 3.12 MG/1
3.12 TABLET ORAL 2 TIMES DAILY
COMMUNITY
Start: 2021-11-29

## 2022-01-05 RX ORDER — POTASSIUM CHLORIDE 1.5 G/1.77G
20 POWDER, FOR SOLUTION ORAL DAILY
COMMUNITY

## 2022-01-05 RX ORDER — AMLODIPINE BESYLATE 10 MG/1
10 TABLET ORAL DAILY
COMMUNITY
Start: 2021-12-20

## 2022-01-05 NOTE — PROGRESS NOTES
New Patient Office Visit      Patient Name: Tim Figueroa  : 1960   MRN: [id number]     Referring Physician: Mary Coleman, *    Chief Complaint:    Chief Complaint   Patient presents with   • Seizures     NP/ Pt is here for seizure like activity that started approx 3 years ago. Pt states that when these events occur, he starts coughing and then coughs so hard he starts shaking like a seizure but states that once the shaking stops he is fine. Pt states that this event dose not happen very frequently. Pt states that occasionally with an event he will get nose bleeds.        History of Present Illness: Tim Figueroa is a 61 y.o. male who is here today to establish care with Neurology.  ***    Pertinent Medical History:    Subjective      Review of Systems:   Review of Systems   Constitutional: Negative for fatigue, fever, unexpected weight gain and unexpected weight loss.   HENT: Negative for hearing loss, sore throat, swollen glands, tinnitus and trouble swallowing.    Eyes: Negative for blurred vision, double vision, photophobia and visual disturbance.   Respiratory: Negative for cough, chest tightness and shortness of breath.    Cardiovascular: Negative for chest pain, palpitations and leg swelling.   Gastrointestinal: Negative for constipation, diarrhea and nausea.   Endocrine: Negative for cold intolerance and heat intolerance.   Musculoskeletal: Negative for gait problem, neck pain and neck stiffness.   Skin: Negative for color change and rash.   Allergic/Immunologic: Negative for environmental allergies and food allergies.   Neurological: Positive for seizures and syncope. Negative for dizziness, facial asymmetry, speech difficulty, weakness, headache, memory problem and confusion.   Psychiatric/Behavioral: Negative for agitation, behavioral problems and depressed mood. The patient is not nervous/anxious.        Past Medical History:   Past Medical History:   Diagnosis  Date   • Arthritis    • Cataract    • Environmental allergies    • Hypertension    • Kidney stones        Past Surgical History:   Past Surgical History:   Procedure Laterality Date   • KNEE SURGERY Right 06/10/2021    PT STATES HE HAD A TEAR BUT IS UNSURE OF THE SPECIFIC SURGERY        Family History:   Family History   Problem Relation Age of Onset   • Breast cancer Mother    • Hypertension Mother    • Arthritis Father    • Alcohol abuse Father    • Alzheimer's disease Father    • Hypertension Father    • Stroke Father        Social History:   Social History     Socioeconomic History   • Marital status: Single   Tobacco Use   • Smoking status: Former Smoker     Quit date: 2017     Years since quittin.0   • Smokeless tobacco: Never Used   Vaping Use   • Vaping Use: Never used   Substance and Sexual Activity   • Drug use: Yes     Frequency: 3.0 times per week     Types: Marijuana     Comment: 2-3 grams weekly   • Sexual activity: Defer       Medications:     Current Outpatient Medications:   •  amLODIPine (NORVASC) 10 MG tablet, Take 10 mg by mouth Daily., Disp: , Rfl:   •  carvedilol (COREG) 3.125 MG tablet, Take 3.12 mg by mouth 2 (Two) Times a Day., Disp: , Rfl:   •  celecoxib (CeleBREX) 100 MG capsule, Take 100 mg by mouth 2 (Two) Times a Day., Disp: , Rfl:   •  chlorthalidone (HYGROTON) 25 MG tablet, Take 25 mg by mouth Daily., Disp: , Rfl:   •  famotidine (PEPCID) 40 MG tablet, Take 40 mg by mouth Daily., Disp: , Rfl:   •  loratadine (CLARITIN) 10 MG tablet, Take 10 mg by mouth Daily., Disp: , Rfl:   •  losartan (COZAAR) 100 MG tablet, Take 100 mg by mouth Daily., Disp: , Rfl:   •  potassium chloride (KLOR-CON) 20 MEQ packet, Take 20 mEq by mouth Daily., Disp: , Rfl:   •  pravastatin (PRAVACHOL) 40 MG tablet, Take 40 mg by mouth Daily., Disp: , Rfl:   •  tamsulosin (FLOMAX) 0.4 MG capsule 24 hr capsule, Take 0.4 mg by mouth Daily., Disp: , Rfl:   •  tiZANidine (ZANAFLEX) 4 MG tablet, Take 4 mg by mouth  "2 (Two) Times a Day., Disp: , Rfl:   •  vitamin D (ERGOCALCIFEROL) 1.25 MG (02069 UT) capsule capsule, Take 1,250 Units by mouth 1 (One) Time Per Week., Disp: , Rfl:     Allergies:   No Known Allergies    Objective     Physical Exam:  Vital Signs:   Vitals:    01/05/22 1045   BP: 152/78   Pulse: 82   Temp: 97.3 °F (36.3 °C)   SpO2: 97%   Weight: (!) 165 kg (362 lb 12.8 oz)   Height: 182.9 cm (72\")   PainSc: 0-No pain     Body mass index is 49.2 kg/m².     Physical Exam    Neurologic Exam    Assessment / Plan      Assessment/Plan:   There are no diagnoses linked to this encounter.     Follow Up:   No follow-ups on file.    IRAIDA Ham, FNP-C  [facility]       Please note that portions of this note may have been completed with a voice recognition program. Efforts were made to edit the dictations, but occasionally words are mistranscribed.     "

## 2022-01-05 NOTE — PATIENT INSTRUCTIONS
Seizure, Adult  A seizure is a sudden burst of abnormal electrical and chemical activity in the brain. Seizures usually last from 30 seconds to 2 minutes.   What are the causes?  Common causes of this condition include:  · Fever or infection.  · Problems that affect the brain. These may include:  ? A brain or head injury.  ? Bleeding in the brain.  ? A brain tumor.  · Low levels of blood sugar or salt.  · Kidney problems or liver problems.  · Conditions that are passed from parent to child (are inherited).  · Problems with a substance, such as:  ? Having a reaction to a drug or a medicine.  ? Stopping the use of a substance all of a sudden (withdrawal).  · A stroke.  · Disorders that affect how you develop.  Sometimes, the cause may not be known.   What increases the risk?  · Having someone in your family who has epilepsy. In this condition, seizures happen again and again over time. They have no clear cause.  · Having had a tonic-clonic seizure before. This type of seizure causes you to:  ? Tighten the muscles of the whole body.  ? Lose consciousness.  · Having had a head injury or strokes before.  · Having had a lack of oxygen at birth.  What are the signs or symptoms?  There are many types of seizures. The symptoms vary depending on the type of seizure you have.  Symptoms during a seizure  · Shaking that you cannot control (convulsions) with fast, jerky movements of muscles.  · Stiffness of the body.  · Breathing problems.  · Feeling mixed up (confused).  · Staring or not responding to sound or touch.  · Head nodding.  · Eyes that blink, flutter, or move fast.  · Drooling, grunting, or making clicking sounds with your mouth  · Losing control of when you pee or poop.  Symptoms before a seizure  · Feeling afraid, nervous, or worried.  · Feeling like you may vomit.  · Feeling like:  ? You are moving when you are not.  ? Things around you are moving when they are not.  · Feeling like you saw or heard something before  (richard thomas).  · Odd tastes or smells.  · Changes in how you see. You may see flashing lights or spots.  Symptoms after a seizure  · Feeling confused.  · Feeling sleepy.  · Headache.  · Sore muscles.  How is this treated?  If your seizure stops on its own, you will not need treatment. If your seizure lasts longer than 5 minutes, you will normally need treatment. Treatment may include:  · Medicines given through an IV tube.  · Avoiding things, such as medicines, that are known to cause your seizures.  · Medicines to prevent seizures.  · A device to prevent or control seizures.  · Surgery.  · A diet low in carbohydrates and high in fat (ketogenic diet).  Follow these instructions at home:  Medicines  · Take over-the-counter and prescription medicines only as told by your doctor.  · Avoid foods or drinks that may keep your medicine from working, such as alcohol.  Activity  · Follow instructions about driving, swimming, or doing things that would be dangerous if you had another seizure. Wait until your doctor says it is safe for you to do these things.  · If you live in the [place] ask your local department of [employer] when you can drive.  · Get a lot of rest.  Teaching others    · Teach friends and family what to do when you have a seizure. They should:  ? Help you get down to the ground.  ? Protect your head and body.  ? Loosen any clothing around your neck.  ? Turn you on your side.  ? Know whether or not you need emergency care.  ? Stay with you until you are better.  · Also, tell them what not to do if you have a seizure. Tell them:  ? They should not hold you down.  ? They should not put anything in your mouth.    General instructions  · Avoid anything that gives you seizures.  · Keep a seizure diary. Write down:  ? What you remember about each seizure.  ? What you think caused each seizure.  · Keep all follow-up visits.  Contact a doctor if:  · You have another seizure or seizures. Call the doctor each time you  have a seizure.  · The pattern of your seizures changes.  · You keep having seizures with treatment.  · You have symptoms of being sick or having an infection.  · You are not able to take your medicine.  Get help right away if:  · You have any of these problems:  ? A seizure that lasts longer than 5 minutes.  ? Many seizures in a row and you do not feel better between seizures.  ? A seizure that makes it harder to breathe.  ? A seizure and you can no longer speak or use part of your body.  · You do not wake up right after a seizure.  · You get hurt during a seizure.  · You feel confused or have pain right after a seizure.  These symptoms may be an emergency. Get help right away. Call your local emergency services (911 in the [place]  · Do not wait to see if the symptoms will go away.  · Do not drive yourself to the hospital.  Summary  · A seizure is a sudden burst of abnormal electrical and chemical activity in the brain. Seizures normally last from 30 seconds to 2 minutes.  · Causes of seizures include illness, injury to the head, low levels of blood sugar or salt, and certain conditions.  · Most seizures will stop on their own in less than 5 minutes. Seizures that last longer than 5 minutes are a medical emergency and need treatment right away.  · Many medicines are used to treat seizures. Take over-the-counter and prescription medicines only as told by your doctor.  This information is not intended to replace advice given to you by your health care provider. Make sure you discuss any questions you have with your health care provider.  Document Revised: 06/25/2021 Document Reviewed: 06/25/2021  [employer] Patient Education © 2021 [employer]  Syncope  Syncope is when you pass out (faint) for a short time. It is caused by a sudden decrease in blood flow to the brain. Signs that you may be about to pass out include:  · Feeling dizzy or light-headed.  · Feeling sick to your stomach (nauseous).  · Seeing all white or  all black.  · Having cold, clammy skin.  If you pass out, get help right away. Call your local emergency services (911 in the [place] Do not drive yourself to the hospital.  Follow these instructions at home:  Watch for any changes in your symptoms. Take these actions to stay safe and help with your symptoms:  Lifestyle  · Do not drive, use machinery, or play sports until your doctor says it is okay.  · Do not drink alcohol.  · Do not use any products that contain nicotine or tobacco, such as cigarettes and e-cigarettes. If you need help quitting, ask your doctor.  · Drink enough fluid to keep your pee (urine) pale yellow.  General instructions  · Take over-the-counter and prescription medicines only as told by your doctor.  · If you are taking blood pressure or heart medicine, sit up and stand up slowly. Spend a few minutes getting ready to sit and then stand. This can help you feel less dizzy.  · Have someone stay with you until you feel stable.  · If you start to feel like you might pass out, lie down right away and raise (elevate) your feet above the level of your heart. Breathe deeply and steadily. Wait until all of the symptoms are gone.  · Keep all follow-up visits as told by your doctor. This is important.  Get help right away if:  · You have a very bad headache.  · You pass out once or more than once.  · You have pain in your chest, belly, or back.  · You have a very fast or uneven heartbeat (palpitations).  · It hurts to breathe.  · You are bleeding from your mouth or your bottom (rectum).  · You have black or tarry poop (stool).  · You have jerky movements that you cannot control (seizure).  · You are confused.  · You have trouble walking.  · You are very weak.  · You have vision problems.  These symptoms may be an emergency. Do not wait to see if the symptoms will go away. Get medical help right away. Call your local emergency services (911 in the [place] Do not drive yourself to the  hospital.  Summary  · Syncope is when you pass out (faint) for a short time. It is caused by a sudden decrease in blood flow to the brain.  · Signs that you may be about to faint include feeling dizzy, light-headed, or sick to your stomach, seeing all white or all black, or having cold, clammy skin.  · If you start to feel like you might pass out, lie down right away and raise (elevate) your feet above the level of your heart. Breathe deeply and steadily. Wait until all of the symptoms are gone.  This information is not intended to replace advice given to you by your health care provider. Make sure you discuss any questions you have with your health care provider.  Document Revised: 01/27/2021 Document Reviewed: 01/30/2019  [employer] Patient Education © 2021 [employer]  Sleep Apnea  Sleep apnea affects breathing during sleep. It causes breathing to stop for a short time or to become shallow. It can also increase the risk of:  · Heart attack.  · Stroke.  · Being very overweight (obese).  · Diabetes.  · Heart failure.  · Irregular heartbeat.  The goal of treatment is to help you breathe normally again.  What are the causes?  There are three kinds of sleep apnea:  · Obstructive sleep apnea. This is caused by a blocked or collapsed airway.  · Central sleep apnea. This happens when the brain does not send the right signals to the muscles that control breathing.  · Mixed sleep apnea. This is a combination of obstructive and central sleep apnea.  The most common cause of this condition is a collapsed or blocked airway. This can happen if:  · Your throat muscles are too relaxed.  · Your tongue and tonsils are too large.  · You are overweight.  · Your airway is too small.  What increases the risk?  · Being overweight.  · Smoking.  · Having a small airway.  · Being older.  · Being male.  · Drinking alcohol.  · Taking medicines to calm yourself (sedatives or tranquilizers).  · Having family members with the condition.  What  are the signs or symptoms?  · Trouble staying asleep.  · Being sleepy or tired during the day.  · Getting angry a lot.  · Loud snoring.  · Headaches in the morning.  · Not being able to focus your mind (concentrate).  · Forgetting things.  · Less interest in sex.  · Mood swings.  · Personality changes.  · Feelings of sadness (depression).  · Waking up a lot during the night to pee (urinate).  · Dry mouth.  · Sore throat.  How is this diagnosed?  · Your medical history.  · A physical exam.  · A test that is done when you are sleeping (sleep study). The test is most often done in a sleep lab but may also be done at home.  How is this treated?    · Sleeping on your side.  · Using a medicine to get rid of mucus in your nose (decongestant).  · Avoiding the use of alcohol, medicines to help you relax, or certain pain medicines (narcotics).  · Losing weight, if needed.  · Changing your diet.  · Not smoking.  · Using a machine to open your airway while you sleep, such as:  ? An oral appliance. This is a mouthpiece that shifts your lower jaw forward.  ? A CPAP device. This device blows air through a mask when you breathe out (exhale).  ? An EPAP device. This has valves that you put in each nostril.  ? A BPAP device. This device blows air through a mask when you breathe in (inhale) and breathe out.  · Having surgery if other treatments do not work.  It is important to get treatment for sleep apnea. Without treatment, it can lead to:  · High blood pressure.  · Coronary artery disease.  · In men, not being able to have an erection (impotence).  · Reduced thinking ability.  Follow these instructions at home:  Lifestyle  · Make changes that your doctor recommends.  · Eat a healthy diet.  · Lose weight if needed.  · Avoid alcohol, medicines to help you relax, and some pain medicines.  · Do not use any products that contain nicotine or tobacco, such as cigarettes, e-cigarettes, and chewing tobacco. If you need help quitting, ask  your doctor.  General instructions  · Take over-the-counter and prescription medicines only as told by your doctor.  · If you were given a machine to use while you sleep, use it only as told by your doctor.  · If you are having surgery, make sure to tell your doctor you have sleep apnea. You may need to bring your device with you.  · Keep all follow-up visits as told by your doctor. This is important.  Contact a doctor if:  · The machine that you were given to use during sleep bothers you or does not seem to be working.  · You do not get better.  · You get worse.  Get help right away if:  · Your chest hurts.  · You have trouble breathing in enough air.  · You have an uncomfortable feeling in your back, arms, or stomach.  · You have trouble talking.  · One side of your body feels weak.  · A part of your face is hanging down.  These symptoms may be an emergency. Do not wait to see if the symptoms will go away. Get medical help right away. Call your local emergency services (911 in the [place] Do not drive yourself to the hospital.  Summary  · This condition affects breathing during sleep.  · The most common cause is a collapsed or blocked airway.  · The goal of treatment is to help you breathe normally while you sleep.  This information is not intended to replace advice given to you by your health care provider. Make sure you discuss any questions you have with your health care provider.  Document Revised: 10/04/2019 Document Reviewed: 08/13/2019  [employer] Patient Education © 2021 [employer]
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
## 2022-01-24 ENCOUNTER — LAB (OUTPATIENT)
Dept: LAB | Facility: HOSPITAL | Age: 62
End: 2022-01-24

## 2022-01-24 DIAGNOSIS — Z01.812 ENCOUNTER FOR PREPROCEDURE SCREENING LABORATORY TESTING FOR COVID-19: ICD-10-CM

## 2022-01-24 DIAGNOSIS — Z20.822 ENCOUNTER FOR PREPROCEDURE SCREENING LABORATORY TESTING FOR COVID-19: ICD-10-CM

## 2022-01-24 PROCEDURE — U0004 COV-19 TEST NON-CDC HGH THRU: HCPCS

## 2022-01-24 PROCEDURE — C9803 HOPD COVID-19 SPEC COLLECT: HCPCS

## 2022-01-25 LAB — SARS-COV-2 RNA PNL SPEC NAA+PROBE: NOT DETECTED

## 2022-01-26 ENCOUNTER — APPOINTMENT (OUTPATIENT)
Dept: LAB | Facility: HOSPITAL | Age: 62
End: 2022-01-26

## 2022-01-27 ENCOUNTER — HOSPITAL ENCOUNTER (OUTPATIENT)
Dept: SLEEP MEDICINE | Facility: HOSPITAL | Age: 62
End: 2022-01-27

## 2022-01-27 ENCOUNTER — HOSPITAL ENCOUNTER (OUTPATIENT)
Dept: ULTRASOUND IMAGING | Facility: HOSPITAL | Age: 62
Discharge: HOME | End: 2022-01-27

## 2022-01-27 ENCOUNTER — HOSPITAL ENCOUNTER (OUTPATIENT)
Dept: MRI IMAGING | Facility: HOSPITAL | Age: 62
Discharge: HOME | End: 2022-01-27

## 2022-01-27 DIAGNOSIS — I10 ESSENTIAL HYPERTENSION: ICD-10-CM

## 2022-01-27 DIAGNOSIS — R06.83 SNORING: ICD-10-CM

## 2022-01-27 DIAGNOSIS — E78.5 DYSLIPIDEMIA: ICD-10-CM

## 2022-01-27 DIAGNOSIS — R41.82 ALTERED MENTAL STATUS, UNSPECIFIED ALTERED MENTAL STATUS TYPE: ICD-10-CM

## 2022-01-27 DIAGNOSIS — G47.9 RESTLESS SLEEPER: ICD-10-CM

## 2022-01-27 PROCEDURE — 95816 EEG AWAKE AND DROWSY: CPT

## 2022-01-27 PROCEDURE — 93880 EXTRACRANIAL BILAT STUDY: CPT

## 2022-01-27 PROCEDURE — 95811 POLYSOM 6/>YRS CPAP 4/> PARM: CPT

## 2022-01-27 PROCEDURE — A9577 INJ MULTIHANCE: HCPCS | Performed by: NURSE PRACTITIONER

## 2022-01-27 PROCEDURE — 95811 POLYSOM 6/>YRS CPAP 4/> PARM: CPT | Performed by: INTERNAL MEDICINE

## 2022-01-27 PROCEDURE — 70553 MRI BRAIN STEM W/O & W/DYE: CPT

## 2022-01-27 PROCEDURE — 0 GADOBENATE DIMEGLUMINE 529 MG/ML SOLUTION: Performed by: NURSE PRACTITIONER

## 2022-01-27 RX ADMIN — GADOBENATE DIMEGLUMINE 25 ML: 529 INJECTION, SOLUTION INTRAVENOUS at 08:49

## 2022-02-02 ENCOUNTER — TELEPHONE (OUTPATIENT)
Dept: NEUROLOGY | Facility: CLINIC | Age: 62
End: 2022-02-02

## 2022-02-08 ENCOUNTER — TELEPHONE (OUTPATIENT)
Dept: NEUROLOGY | Facility: CLINIC | Age: 62
End: 2022-02-08

## 2022-02-08 NOTE — TELEPHONE ENCOUNTER
"KELLY CAUSEY @  CARDIOLOGY OFFICE CALLED    [phone]    PATIENT HAS ECHO SCHEDULED Friday IN OFFICE BUT THE REFERRAL THEY HAVE SAYS \"HOSPITAL PERFORMED\". THEY NEED AN AUTH FOR IN-OFFICE BEFORE [facility] OR WILL NEED TO RESCHED. PLEASE ADVISE  "

## 2022-02-09 ENCOUNTER — OFFICE VISIT (OUTPATIENT)
Dept: NEUROLOGY | Facility: CLINIC | Age: 62
End: 2022-02-09

## 2022-02-09 VITALS
DIASTOLIC BLOOD PRESSURE: 80 MMHG | HEIGHT: 72 IN | OXYGEN SATURATION: 94 % | BODY MASS INDEX: 42.66 KG/M2 | TEMPERATURE: 97.1 F | HEART RATE: 79 BPM | SYSTOLIC BLOOD PRESSURE: 140 MMHG | WEIGHT: 315 LBS

## 2022-02-09 DIAGNOSIS — R05.4 COUGH SYNCOPE: ICD-10-CM

## 2022-02-09 DIAGNOSIS — G47.33 OBSTRUCTIVE SLEEP APNEA: Primary | ICD-10-CM

## 2022-02-09 DIAGNOSIS — R55 COUGH SYNCOPE: ICD-10-CM

## 2022-02-09 PROCEDURE — 99213 OFFICE O/P EST LOW 20 MIN: CPT | Performed by: NURSE PRACTITIONER

## 2022-02-09 RX ORDER — POTASSIUM CHLORIDE 20 MEQ/1
TABLET, EXTENDED RELEASE ORAL
COMMUNITY
Start: 2022-01-07
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
## 2022-05-09 ENCOUNTER — OFFICE VISIT (OUTPATIENT)
Dept: NEUROLOGY | Facility: CLINIC | Age: 62
End: 2022-05-09

## 2022-05-09 VITALS
HEART RATE: 78 BPM | BODY MASS INDEX: 42.66 KG/M2 | HEIGHT: 72 IN | WEIGHT: 315 LBS | TEMPERATURE: 97.8 F | OXYGEN SATURATION: 93 % | DIASTOLIC BLOOD PRESSURE: 80 MMHG | SYSTOLIC BLOOD PRESSURE: 140 MMHG

## 2022-05-09 DIAGNOSIS — G47.33 OBSTRUCTIVE SLEEP APNEA: Primary | ICD-10-CM

## 2022-05-09 PROCEDURE — 99213 OFFICE O/P EST LOW 20 MIN: CPT | Performed by: NURSE PRACTITIONER

## 2022-05-09 NOTE — PROGRESS NOTES
Follow Up Office Visit      Patient Name: Tim Figueroa  : 1960   MRN: [id number]     Chief Complaint:    Chief Complaint   Patient presents with   • Follow-up     Patient in office to follow up on lian.          History of Present Illness: Tim Figueroa is a 61 y.o. male who is here today to follow up with LIAN and was last seen on 2022.  Currently on AutoCPAP 10-20cm, AHI 1.4/hour, compliance 100%.  He is tolerating his pressures well.  He is sleeping much better, has much less daytime sleepiness and feeling better overall.  He is no longer napping during the day.  He is wearing a full face mask and using [employer] [facility].  Current compliance report reviewed and scanned into patient's EMR.   *Split night study on 2022 showed severe LIAN with an AHI of 74/hour; titration portion was unsuccessfully and it was suggested he be tried on AutoPap therapy.     Following taken from previous visit note:   Tim Figueroa is a 61 y.o. male who is here today to follow up and was last seen on 2022.  He has had no episodes of transient alteration of awareness or syncope.    *PSG on 2022 showed severe LIAN with an AHI of 74/hour and a titration was attempted but a mask leak and elevated AHI persisted.  An order for AutoPap 10-20cm was sent to the [facility] but he has not been set up.  EEG on 2022 was normal.  Carotid US on 2022 was noncontributory.  Echocardiogram is scheduled for 2022.      Subjective      Review of Systems:   Review of Systems   Constitutional: Negative for chills, fatigue and fever.   HENT: Negative for facial swelling, hearing loss, sore throat, tinnitus and trouble swallowing.    Eyes: Negative for blurred vision, double vision, photophobia and visual disturbance.   Respiratory: Positive for apnea. Negative for cough, chest tightness and shortness of breath.    Cardiovascular: Negative for chest pain, palpitations and leg swelling.    Gastrointestinal: Negative for abdominal pain, nausea and vomiting.   Endocrine: Negative for cold intolerance and heat intolerance.   Musculoskeletal: Negative for gait problem, neck pain and neck stiffness.   Skin: Negative for color change and rash.   Allergic/Immunologic: Negative for environmental allergies and food allergies.   Neurological: Negative for dizziness, syncope, speech difficulty, weakness, light-headedness, numbness, headache and memory problem.   Psychiatric/Behavioral: Negative for behavioral problems, sleep disturbance and depressed mood. The patient is not nervous/anxious.        I have reviewed and the following portions of the patient's history were updated as appropriate: past family history, past medical history, past social history, past surgical history and problem list.    Medications:     Current Outpatient Medications:   •  amLODIPine (NORVASC) 10 MG tablet, Take 10 mg by mouth Daily., Disp: , Rfl:   •  carvedilol (COREG) 3.125 MG tablet, Take 3.12 mg by mouth 2 (Two) Times a Day., Disp: , Rfl:   •  celecoxib (CeleBREX) 100 MG capsule, Take 100 mg by mouth 2 (Two) Times a Day., Disp: , Rfl:   •  chlorthalidone (HYGROTON) 25 MG tablet, Take 25 mg by mouth Daily., Disp: , Rfl:   •  famotidine (PEPCID) 40 MG tablet, Take 40 mg by mouth Daily., Disp: , Rfl:   •  loratadine (CLARITIN) 10 MG tablet, Take 10 mg by mouth Daily., Disp: , Rfl:   •  losartan (COZAAR) 100 MG tablet, Take 100 mg by mouth Daily., Disp: , Rfl:   •  potassium chloride (K-DUR,KLOR-CON) 20 MEQ CR tablet, , Disp: , Rfl:   •  potassium chloride (KLOR-CON) 20 MEQ packet, Take 20 mEq by mouth Daily., Disp: , Rfl:   •  pravastatin (PRAVACHOL) 40 MG tablet, Take 40 mg by mouth Daily., Disp: , Rfl:   •  tamsulosin (FLOMAX) 0.4 MG capsule 24 hr capsule, Take 0.4 mg by mouth Daily., Disp: , Rfl:   •  tiZANidine (ZANAFLEX) 4 MG tablet, Take 4 mg by mouth 2 (Two) Times a Day., Disp: , Rfl:   •  vitamin D (ERGOCALCIFEROL) 1.25  "MG (61558 UT) capsule capsule, Take 1,250 Units by mouth 1 (One) Time Per Week., Disp: , Rfl:     Allergies:   No Known Allergies    Objective     Physical Exam:  Vital Signs:   Vitals:    05/09/22 1325   BP: 140/80   BP Location: Right arm   Patient Position: Sitting   Cuff Size: Adult   Pulse: 78   Temp: 97.8 °F (36.6 °C)   SpO2: 93%   Weight: (!) 165 kg (363 lb)   Height: 182.9 cm (72\")   PainSc: 0-No pain     Body mass index is 49.23 kg/m².    Physical Exam  Vitals and nursing note reviewed.   Constitutional:       General: He is not in acute distress.     Appearance: He is well-developed. He is obese. He is not diaphoretic.   HENT:      Head: Normocephalic and atraumatic.   Eyes:      Extraocular Movements: Extraocular movements intact.      Conjunctiva/sclera: Conjunctivae normal.   Pulmonary:      Effort: Pulmonary effort is normal. No respiratory distress.   Musculoskeletal:         General: Normal range of motion.   Skin:     General: Skin is warm and dry.      Findings: No rash.   Neurological:      Mental Status: He is alert and oriented to person, place, and time.   Psychiatric:         Mood and Affect: Mood normal.         Behavior: Behavior normal.         Thought Content: Thought content normal.         Judgment: Judgment normal.         Neurologic Exam     Mental Status   Oriented to person, place, and time.        Assessment / Plan      Assessment/Plan:   Diagnoses and all orders for this visit:    1. Obstructive sleep apnea (Primary)    2. BMI 40.0-44.9, adult (HCC)    *Encouraged weight loss with a BMI goal of 24.   *Advised no driving if drowsy.     Follow Up:   Return in about 6 months (around 11/9/2022) for F/U Obstructive Sleep Apnea.    IRAIDA Ham, FNP-C  [facility]       Please note that portions of this note may have been completed with a voice recognition program. Efforts were made to edit the dictations, but occasionally words are " mistranscribed.

## 2022-09-09 ENCOUNTER — HOSPITAL ENCOUNTER (OUTPATIENT)
Dept: CT IMAGING | Facility: HOSPITAL | Age: 62
Discharge: HOME | End: 2022-09-09
Payer: COMMERCIAL

## 2022-09-09 DIAGNOSIS — F17.200 TOBACCO USE DISORDER: ICD-10-CM

## 2022-09-09 PROCEDURE — 71271 CT THORAX LUNG CANCER SCR C-: CPT

## 2022-11-15 ENCOUNTER — OFFICE VISIT (OUTPATIENT)
Dept: NEUROLOGY | Facility: CLINIC | Age: 62
End: 2022-11-15

## 2022-11-15 DIAGNOSIS — G47.33 OBSTRUCTIVE SLEEP APNEA: Primary | ICD-10-CM

## 2022-11-15 PROCEDURE — 99212 OFFICE O/P EST SF 10 MIN: CPT | Performed by: NURSE PRACTITIONER

## 2022-11-15 NOTE — PROGRESS NOTES
Follow up Sleep Patient Office Visit      Patient Name: Tim Figueroa  : 1960   MRN: [id number]     Referring Physician: No ref. provider found    Chief Complaint:    Chief Complaint   Patient presents with   • Follow-up     Patient following up on lian.        History of Present Illness: Tim Figueroa is a 62 y.o. male who is here today to follow up with LIAN and was last seen in May 2021.  He is currently on AutoPap therapy and tolerating his pressures well.  He is no longer taking naps during the day, he has more energy during the day, and he is sleeping very well.  He is wearing a full face mask and using [employer] [facility].     Pertinent Medical History:   Current compliance report has been requested for review.     Following taken from previous visit note:  Tim Figueroa is a 61 y.o. male who is here today to follow up with LIAN and was last seen on 2022.  Currently on AutoCPAP 10-20cm, AHI 1.4/hour, compliance 100%.  He is tolerating his pressures well.  He is sleeping much better, has much less daytime sleepiness and feeling better overall.  He is no longer napping during the day.  He is wearing a full face mask and using [employer].  Current compliance report reviewed and scanned into patient's EMR.   *Split night study on 2022 showed severe LIAN with an AHI of 74/hour; titration portion was unsuccessfully and it was suggested he be tried on AutoPap therapy.      Subjective      Review of Systems:   Review of Systems   Constitutional: Negative for chills, fatigue and fever.   HENT: Negative for facial swelling, hearing loss, sore throat, tinnitus and trouble swallowing.    Eyes: Negative for blurred vision, double vision, photophobia and visual disturbance.   Respiratory: Negative for cough, chest tightness and shortness of breath.    Cardiovascular: Negative for chest pain, palpitations and leg swelling.   Gastrointestinal: Negative for abdominal pain, nausea and  vomiting.   Endocrine: Negative for cold intolerance and heat intolerance.   Musculoskeletal: Negative for gait problem, neck pain and neck stiffness.   Skin: Negative for color change and rash.   Allergic/Immunologic: Negative for environmental allergies and food allergies.   Neurological: Negative for dizziness, syncope, speech difficulty, weakness, light-headedness, numbness, headache and memory problem.   Psychiatric/Behavioral: Negative for behavioral problems, sleep disturbance and depressed mood. The patient is not nervous/anxious.        Medications:     Current Outpatient Medications:   •  amLODIPine (NORVASC) 10 MG tablet, Take 10 mg by mouth Daily., Disp: , Rfl:   •  carvedilol (COREG) 3.125 MG tablet, Take 3.12 mg by mouth 2 (Two) Times a Day., Disp: , Rfl:   •  celecoxib (CeleBREX) 100 MG capsule, Take 100 mg by mouth 2 (Two) Times a Day., Disp: , Rfl:   •  chlorthalidone (HYGROTON) 25 MG tablet, Take 25 mg by mouth Daily., Disp: , Rfl:   •  famotidine (PEPCID) 40 MG tablet, Take 40 mg by mouth Daily., Disp: , Rfl:   •  loratadine (CLARITIN) 10 MG tablet, Take 10 mg by mouth Daily., Disp: , Rfl:   •  losartan (COZAAR) 100 MG tablet, Take 100 mg by mouth Daily., Disp: , Rfl:   •  potassium chloride (K-DUR,KLOR-CON) 20 MEQ CR tablet, , Disp: , Rfl:   •  potassium chloride (KLOR-CON) 20 MEQ packet, Take 20 mEq by mouth Daily., Disp: , Rfl:   •  pravastatin (PRAVACHOL) 40 MG tablet, Take 40 mg by mouth Daily., Disp: , Rfl:   •  tamsulosin (FLOMAX) 0.4 MG capsule 24 hr capsule, Take 0.4 mg by mouth Daily., Disp: , Rfl:   •  tiZANidine (ZANAFLEX) 4 MG tablet, Take 4 mg by mouth 2 (Two) Times a Day., Disp: , Rfl:   •  vitamin D (ERGOCALCIFEROL) 1.25 MG (29151 UT) capsule capsule, Take 1,250 Units by mouth 1 (One) Time Per Week., Disp: , Rfl:     Allergies:   No Known Allergies    Objective     Physical Exam:  Vital Signs: There were no vitals filed for this visit.  BMI: There is no height or weight on file to  calculate BMI.    Physical Exam  Neurological:      Mental Status: He is alert and oriented to person, place, and time.   Psychiatric:         Mood and Affect: Mood normal.         Thought Content: Thought content normal.         Assessment / Plan      Assessment/Plan:   Diagnoses and all orders for this visit:    1. Obstructive sleep apnea (Primary)    *Advised patient to avoid driving if drowsy.   *Will review current compliance report once it is available.      *This was a telephone visit that lasted for 8 minutes.  The patient was in his vehicle and I was in my home office ([place], [place]) during the entire visit.  Patient identity was verified x2.     Follow Up:   Return in about 1 year (around 11/15/2023) for F/U Obstructive Sleep Apnea.    I have advised the patient the need to continue the use of CPAP.  Gold standard for treatment of sleep apnea includes weight loss, use of cpap and avoidance of alcohol.  Untreated ALMAZ may increase the risk for development of hypertension, stroke, myocardial infarction, diabetes, cardiovascular disease, work-related issues and driving accidents. I have counseled and advised the patient to avoid driving or operating heavy/dangerous equipment if feeling drowsy.     IRAIDA Ham, FNP-C  [facility]       Please note that portions of this note may have been completed with a voice recognition program. Efforts were made to edit the dictations, but occasionally words are mistranscribed.

## 2023-05-24 ENCOUNTER — HOSPITAL ENCOUNTER (OUTPATIENT)
Facility: HOSPITAL | Age: 63
Discharge: HOME | End: 2023-05-24
Payer: COMMERCIAL

## 2023-05-24 ENCOUNTER — HOSPITAL ENCOUNTER (OUTPATIENT)
Dept: GENERAL RADIOLOGY | Facility: HOSPITAL | Age: 63
Discharge: HOME | End: 2023-05-24
Payer: COMMERCIAL

## 2023-05-24 DIAGNOSIS — M25.511 ACUTE PAIN OF RIGHT SHOULDER: ICD-10-CM

## 2023-05-24 PROCEDURE — 73030 X-RAY EXAM OF SHOULDER: CPT

## 2023-06-01 ENCOUNTER — HOSPITAL ENCOUNTER (OUTPATIENT)
Dept: PHYSICAL THERAPY | Facility: HOSPITAL | Age: 63
Setting detail: THERAPIES SERIES
Discharge: HOME | End: 2023-06-01
Payer: COMMERCIAL

## 2023-06-01 PROCEDURE — 97161 PT EVAL LOW COMPLEX 20 MIN: CPT

## 2023-06-01 PROCEDURE — 97110 THERAPEUTIC EXERCISES: CPT

## 2023-06-01 ASSESSMENT — PAIN DESCRIPTION - PAIN TYPE: TYPE: CHRONIC PAIN

## 2023-06-01 ASSESSMENT — PAIN DESCRIPTION - DESCRIPTORS: DESCRIPTORS: SHARP;HEAVINESS

## 2023-06-01 ASSESSMENT — PAIN DESCRIPTION - LOCATION: LOCATION: SHOULDER

## 2023-06-01 ASSESSMENT — PAIN DESCRIPTION - ORIENTATION: ORIENTATION: RIGHT

## 2023-06-01 ASSESSMENT — PAIN SCALES - GENERAL: PAINLEVEL_OUTOF10: 4

## 2023-06-01 NOTE — PROGRESS NOTES
mobility , Decreased ADL status, Decreased ROM, Decreased strength, Increased pain, Decreased posture, Decreased sensation, Decreased body mechanics    Statement of Medical Necessity: Physical Therapy is both indicated and medically necessary as outlined in the POC to increase the likelihood of meeting the functionally related goals stated below. Patient's Activity Tolerance: Patient tolerated treatment well, Patient tolerated evaluation without incident      Patient's rehabilitation potential/prognosis is considered to be: Excellent    Factors which may impact rehabilitation potential include: Chronicity of problem (smoker)        GOALS   Patient Goal(s):  To be able to lift objects without pain  Short Term Goals Completed by 4 weeks Goal Status   Pt to be I with HEP     Pt to improve RUE shoulder AROM flexion to 0-165 or greater     Pt to improve RUE shoulder AROM abduction to 0-165 or greater     Pt to verbalize a 50% improvement in shoulder pain symptoms                                             Long Term Goals Completed by 8 weeks Goal Status   Pt to improve quick dash score to 10% or less     Pt to have 0/10 pain with palpation to supraspinatus tendon     Pt to improve RUE gross str to 5/5     Pt to demonstrate proper postural awareness                                              TREATMENT PLAN       Requires PT Follow-Up: Yes    Pt. actively involved in establishing Plan of Care and Goals: Yes  Patient/ Caregiver education and instruction: Goals, PT Role, Plan of Care, Evaluative findings, Home Exercise Program, Anatomy of condition, Body mechanics             Treatment may include any combination of the following: Current Treatment Recommendations: Strengthening, ROM, Neuromuscular re-education, Home exercise program, Modalities, Pain management, Dry needling, Manual     Frequency / Duration:  Patient to be seen 2x week for 8 weeks weeks      Eval Complexity:    Decision Making: Low Complexity    PT

## 2023-06-06 ENCOUNTER — HOSPITAL ENCOUNTER (OUTPATIENT)
Dept: PHYSICAL THERAPY | Facility: HOSPITAL | Age: 63
Setting detail: THERAPIES SERIES
Discharge: HOME | End: 2023-06-06
Payer: COMMERCIAL

## 2023-06-06 PROCEDURE — 97140 MANUAL THERAPY 1/> REGIONS: CPT

## 2023-06-06 PROCEDURE — 97110 THERAPEUTIC EXERCISES: CPT

## 2023-06-06 NOTE — FLOWSHEET NOTE
other medical complications  [x] Other: Pt suggested to perform pec stretches at home unilaterally and wall slides in seated position. Pt progressed through exercises with min cues for proper form for ER muscle recruitment. Pt able to complete all exercises with occa rest breaks required. Pt reports increased tenderness during TFM to supraspinatus tendon.      Pain after treatment: 1/10    Prognosis: [x] Good [] Fair  [] Poor    Patient Requires Follow-up: [x] Yes  [] No    Plan:   [x] Continue per plan of care [] Alter current plan (see comments)  [] Plan of care initiated [] Hold pending MD visit [] Discharge    Plan for Next Session:        Electronically signed by:  Antonia Cantor PT      .

## 2023-06-08 ENCOUNTER — HOSPITAL ENCOUNTER (OUTPATIENT)
Dept: PHYSICAL THERAPY | Facility: HOSPITAL | Age: 63
Setting detail: THERAPIES SERIES
Discharge: HOME | End: 2023-06-08
Payer: COMMERCIAL

## 2023-06-08 PROCEDURE — 97110 THERAPEUTIC EXERCISES: CPT

## 2023-06-08 PROCEDURE — 97140 MANUAL THERAPY 1/> REGIONS: CPT

## 2023-06-08 NOTE — FLOWSHEET NOTE
Physical Therapy Daily Treatment Note   Date:  2023    TIme In:  830                    Time Out: [address]    Patient Name:  Mayito Olsen    :  1960  MRN: [id number]    Restrictions/Precautions:    Pertinent Medical History:  Medical/Treatment Diagnosis Information:  Acute pain of right shoulder [W98.885]     Insurance/Certification information:  Payor: [place] [id number] / Plan: [place] [id number] / Product Type: *No Product type* /   Physician Information:  [facility]  Plan of care signed (Y/N):    Visit# / total visits:     3 /    G-Code (if applicable):      Date / Visit # G-Code Applied:         Progress Note: []  Yes  [x]  No  Next due by: Visit #10       Pain level: 0 /10    Subjective: Pt reports success with performing modified HEP exercises that he had with previous difficulty at home. He also states after last session he had no pain during the day except at night.      Objective:  Observation:   Test measurements:    Palpation:    Exercises:  Exercise Resistance/Repetitions Date performed   Pulleys x3' 8   Pendulums  CW x20 CCW x20 8   Doorway pec stretch  3x45\" 8   Rows 3x10 2\" CC #17.5 8   Shoulder ER neutral  3x10 2\" CC #10 8   Shoulder IR neutral  3x10 2\" CC #17.5 8   Shoulder ER in horz abduction 2x10 2\" BTB 8   Shoulder IR in horz abduction 2x10 2\" BTB 8   Shoulder flexion wall slides  3x10 8   shoulder abduction wall slides  2x10 8   Low trap setting  2x8 8   Sidelying ER  2x10 2\" OTB 8   UBE 2' fwd 2' bwd level 2 Next time     Other Therapeutic Activities:      Manual Treatments:    PROM into abd, flexion, ER, IR. ER/IR mobs, TFM to Supraspinatus tendon applied biofreeze x15'     Modalities:  Pt declined      Timed Code Treatment Minutes:  47      Total Treatment Minutes:  50    Treatment/Activity Tolerance:  [x] Patient tolerated treatment well [] Patient limited by fatigue  [] Patient limited by pain  [] Patient limited by other medical complications  [x] Other: Pt

## 2023-06-13 ENCOUNTER — APPOINTMENT (OUTPATIENT)
Dept: PHYSICAL THERAPY | Facility: HOSPITAL | Age: 63
End: 2023-06-13
Payer: COMMERCIAL

## 2023-06-15 ENCOUNTER — HOSPITAL ENCOUNTER (OUTPATIENT)
Dept: PHYSICAL THERAPY | Facility: HOSPITAL | Age: 63
Setting detail: THERAPIES SERIES
End: 2023-06-15
Payer: COMMERCIAL

## 2023-06-20 ENCOUNTER — HOSPITAL ENCOUNTER (OUTPATIENT)
Dept: PHYSICAL THERAPY | Facility: HOSPITAL | Age: 63
Setting detail: THERAPIES SERIES
Discharge: HOME | End: 2023-06-20
Payer: COMMERCIAL

## 2023-06-20 PROCEDURE — 97140 MANUAL THERAPY 1/> REGIONS: CPT

## 2023-06-20 PROCEDURE — 97110 THERAPEUTIC EXERCISES: CPT
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
## 2023-06-23 ENCOUNTER — HOSPITAL ENCOUNTER (OUTPATIENT)
Dept: PHYSICAL THERAPY | Facility: HOSPITAL | Age: 63
Setting detail: THERAPIES SERIES
Discharge: HOME | End: 2023-06-23
Payer: COMMERCIAL

## 2023-06-23 PROCEDURE — 97110 THERAPEUTIC EXERCISES: CPT

## 2023-06-23 PROCEDURE — 97140 MANUAL THERAPY 1/> REGIONS: CPT

## 2023-06-23 NOTE — FLOWSHEET NOTE
Physical Therapy Daily Treatment Note   Date:  2023    TIme In:  826                    Time Out: [facility]    Patient Name:  Liborio Patel    :  1960  MRN: [id number]    Restrictions/Precautions:    Pertinent Medical History:  Medical/Treatment Diagnosis Information:  Acute pain of right shoulder [V72.450]     Insurance/Certification information:  Payor: [place] [id number] / Plan: [facility]  / Product Type: *No Product type* /   Physician Information:  Deepak Carvalho  Plan of care signed (Y/N):    Visit# / total visits:     5 /    G-Code (if applicable):      Date / Visit # G-Code Applied:         Progress Note: []  Yes  [x]  No  Next due by: Visit #10       Pain level: 0 /10    Subjective: Pt states he has been performing his HEP at home, no c/o since last tx.       Objective:  Observation:   Test measurements:    Palpation:    Exercises:  Exercise Resistance/Repetitions Date performed   UBE 2' fwd 2' bwd level 2.5 23   Pulleys x3' 23   Pendulums  CW x20 CCW x20 23   Doorway pec stretch  3x45\" 23   Rows 3x10 2\" CC #17.5 23   Shoulder ER neutral  3x10 2\" CC #17.5 23   Shoulder IR neutral  3x10 2\" CC #17.5 23   Shoulder ER in horz abduction 2x10 2\" BTB 23   Shoulder IR in horz abduction 2x10 2\" BTB 23   Shoulder flexion wall slides  3x10 23   shoulder abduction wall slides  2x10 23   Low trap setting  2x5 OTB  23   Sidelying ER  2x10 2\" GTB 23   Supine ER DB hold  #5 3x30\" 23   T's 2x8 23   I's 2x10 23   Y's 2x8 23     Other Therapeutic Activities:      Manual Treatments:    PROM into abd, flexion, ER, IR. ER/IR mobs, TFM to Supraspinatus tendon applied biofreeze x15'     Modalities:        Timed Code Treatment Minutes:  50      Total Treatment Minutes:  52    Treatment/Activity Tolerance:  [x] Patient tolerated treatment well [] Patient limited by fatigue  [] Patient limited by pain  [] Patient limited by other medical complications  [x] Other: Pt progressed through all exercises with

## 2023-06-27 ENCOUNTER — HOSPITAL ENCOUNTER (OUTPATIENT)
Dept: PHYSICAL THERAPY | Facility: HOSPITAL | Age: 63
Setting detail: THERAPIES SERIES
Discharge: HOME | End: 2023-06-27
Payer: COMMERCIAL

## 2023-06-27 PROCEDURE — 97140 MANUAL THERAPY 1/> REGIONS: CPT

## 2023-06-27 PROCEDURE — 97110 THERAPEUTIC EXERCISES: CPT

## 2023-06-30 ENCOUNTER — HOSPITAL ENCOUNTER (OUTPATIENT)
Dept: PHYSICAL THERAPY | Facility: HOSPITAL | Age: 63
Setting detail: THERAPIES SERIES
Discharge: HOME | End: 2023-06-30
Payer: COMMERCIAL

## 2023-06-30 PROCEDURE — 97110 THERAPEUTIC EXERCISES: CPT

## 2023-06-30 PROCEDURE — 97140 MANUAL THERAPY 1/> REGIONS: CPT

## 2023-07-07 ENCOUNTER — HOSPITAL ENCOUNTER (OUTPATIENT)
Dept: PHYSICAL THERAPY | Facility: HOSPITAL | Age: 63
Setting detail: THERAPIES SERIES
Discharge: HOME | End: 2023-07-07
Payer: COMMERCIAL

## 2023-07-07 PROCEDURE — 97140 MANUAL THERAPY 1/> REGIONS: CPT

## 2023-07-07 PROCEDURE — 97110 THERAPEUTIC EXERCISES: CPT

## 2023-07-07 NOTE — FLOWSHEET NOTE
Physical Therapy Daily Treatment Note  Date:  2023    TIme In:  0831                    Time Out: [address]    Patient Name:  Karma Vila    :  1960  MRN: [id number]    Restrictions/Precautions:    Pertinent Medical History:  Medical/Treatment Diagnosis Information:  Acute pain of right shoulder [D09.411]     Insurance/Certification information:  Payor: [address] [place] [address] / Plan: [address] [place] [address] / Product Type: *No Product type* /   Physician Information:  Meenakshi Leo  Plan of care signed (Y/N):    Visit# / total visits:     8 /    G-Code (if applicable):      Date / Visit # G-Code Applied:         Progress Note: []  Yes  [x]  No  Next due by: 23       Pain level: 0/10    Subjective: Pt states he has been doing well this week with his shoulder. He notes having some pain when cooking and washing dishes for prolonged periods, but can see good improvement overall. Objective:  Observation:   Test measurements:          General AROM UE: AROM Assessed  AROM RUE (degrees)  R Shoulder Flexion (0-180): 0-170  R Shoulder ABduction (0-180): 0-166  R Shoulder Int Rotation  (0-70): 0-73  R Shoulder Ext Rotation (0-90): 0-59  Strength RUE  R Shoulder Flexion: 5/5  R Shoulder ABduction: 4+/5  R Shoulder External Rotation: 5/5  R Shoulder Horizontal ABduction: 5/5  Pt has 5-6/10 pain when performing cooking, dishes.   Quick Dash 20%  Palpation: 0/10 pain with Supraspinatus palpation    Exercises:  Exercise Resistance/Repetitions Date performed   UBE 2' fwd 2' bwd level 4.0 7   Pulleys x3' 7   Pendulums  CW x20 CCW x20 7   Doorway pec stretch  3x45\" 7   Rows 3x10 2\" CC #17.5 7   Shoulder ER neutral  3x10 2\" CC #17.5 7   Shoulder IR neutral  3x10 2\" CC #17.5 7   Shoulder ER in horz abduction 2x10 2\" CC 10lbs 7   Shoulder IR in horz abduction 2x10 2\" CC 10lbs 7   Shoulder flexion wall slides  3x10 7   shoulder abduction wall slides  3x10 7   Low trap setting  3x10 OTB  7   Sidelying ER  2x10 2\"

## 2023-07-11 ENCOUNTER — HOSPITAL ENCOUNTER (OUTPATIENT)
Dept: PHYSICAL THERAPY | Facility: HOSPITAL | Age: 63
Setting detail: THERAPIES SERIES
Discharge: HOME | End: 2023-07-11
Payer: COMMERCIAL

## 2023-07-11 PROCEDURE — 97110 THERAPEUTIC EXERCISES: CPT

## 2023-07-11 PROCEDURE — 97140 MANUAL THERAPY 1/> REGIONS: CPT

## 2023-07-11 NOTE — FLOWSHEET NOTE
Physical Therapy Daily Treatment Note  Date:  2023    TIme In:  0830                   Time Out: 8589    Patient Name:  Colonel Womack    :  1960  MRN: [id number]    Restrictions/Precautions:    Pertinent Medical History:  Medical/Treatment Diagnosis Information:  Acute pain of right shoulder [X92.688]     Insurance/Certification information:  Payor: [address] [place] [address] / Plan: [address] [place] [address] / Product Type: *No Product type* /   Physician Information:  Bridget Marker  Plan of care signed (Y/N):    Visit# / total visits:     9 /    G-Code (if applicable):      Date / Visit # G-Code Applied:         Progress Note: []  Yes  [x]  No  Next due by: 23       Pain level: 0/10    Subjective: Pt states his shoulder is feeling ok, he just has some issues with cooking/cleaning   Objective:  Observation:   Test measurements:          General AROM UE: AROM Assessed  AROM RUE (degrees)  R Shoulder Flexion (0-180): 0-170  R Shoulder ABduction (0-180): 0-166  R Shoulder Int Rotation  (0-70): 0-73  R Shoulder Ext Rotation (0-90): 0-59  Strength RUE  R Shoulder Flexion: 5/5  R Shoulder ABduction: 4+/5  R Shoulder External Rotation: 5/5  R Shoulder Horizontal ABduction: 5/5  Pt has 5-6/10 pain when performing cooking, dishes.   Quick Dash 20%  Palpation: 0/10 pain with Supraspinatus palpation    Exercises:  Exercise Resistance/Repetitions Date performed   UBE 2' fwd 2' bwd level 4.0 11   Pulleys x3' 11   Pendulums  CW x20 CCW x20 11   Doorway pec stretch  3x45\" 11   Rows 3x10 2\" CC #17.5 11   Shoulder ER neutral  3x10 2\" CC #17.5 11   Shoulder IR neutral  3x10 2\" CC #17.5 11   Shoulder ER in horz abduction 2x10 2\" CC 17.5lbs 11   Shoulder IR in horz abduction 2x10 2\" CC 17.5lbs 11   Shoulder flexion  #2 1x25 11   shoulder abduction  #1 1x25 11   Low trap setting  3x10 OTB  11   Sidelying ER  2x10 2\" #2 11        T's 2x10 #1  11   I's 2x10 #1 11   Y's 2x10 #1 11   Supine ER DB hold  #5 3x30\"      Other

## 2023-07-14 ENCOUNTER — HOSPITAL ENCOUNTER (OUTPATIENT)
Dept: PHYSICAL THERAPY | Facility: HOSPITAL | Age: 63
Setting detail: THERAPIES SERIES
Discharge: HOME | End: 2023-07-14
Payer: COMMERCIAL

## 2023-07-14 PROCEDURE — 97110 THERAPEUTIC EXERCISES: CPT

## 2023-07-14 PROCEDURE — 97140 MANUAL THERAPY 1/> REGIONS: CPT

## 2023-07-14 NOTE — FLOWSHEET NOTE
Physical Therapy Daily Treatment Note  Date:  2023    TIme In:  08                   Time Out: [address] [facility]    Patient Name:  Anne Troy    :  1960  MRN: [id number]    Restrictions/Precautions:    Pertinent Medical History:  Medical/Treatment Diagnosis Information:  Acute pain of right shoulder [D31.552]     Insurance/Certification information:  Payor: [address] [place] [address] / Plan: [address] [place] [address] / Product Type: *No Product type* /   Physician Information:  Venus Cunha  Plan of care signed (Y/N):    Visit# / total visits:     10 /    G-Code (if applicable):      Date / Visit # G-Code Applied:         Progress Note: []  Yes  [x]  No  Next due by: 23       Pain level: 0/10    Subjective: Pt states his shoulder is doing well today; no issues since last visit earlier this week. Objective:  Observation:   Test measurements:          General AROM UE: AROM Assessed  AROM RUE (degrees)  R Shoulder Flexion (0-180): 0-170  R Shoulder ABduction (0-180): 0-166  R Shoulder Int Rotation  (0-70): 0-73  R Shoulder Ext Rotation (0-90): 0-59  Strength RUE  R Shoulder Flexion: 5/5  R Shoulder ABduction: 4+/5  R Shoulder External Rotation: 5/5  R Shoulder Horizontal ABduction: 5/5  Pt has 5-6/10 pain when performing cooking, dishes.   Quick Dash 20%  Palpation: 0/10 pain with Supraspinatus palpation    Exercises:  Exercise Resistance/Repetitions Date performed   UBE 2' fwd 2' bwd level 4.0 14   Pulleys x3' 14   Pendulums  CW x20 CCW x20 14   Doorway pec stretch  3x45\" 14   Rows 3x10 2\" CC #17.5 14   Shoulder ER neutral  3x10 2\" CC #17.5 14   Shoulder IR neutral  3x10 2\" CC #17.5 14   Shoulder ER in horz abduction 2x10 2\" CC 17.5lbs 14   Shoulder IR in horz abduction 2x10 2\" CC 17.5lbs 14   Shoulder flexion  #2 1x25 14   shoulder abduction  #1 1x25 14   Low trap setting  3x10 OTB  14   Sidelying ER  2x10 2\" #2 14        T's 2x10 #1  14   I's 2x10 #1 14   Y's 2x10 #1 14   Supine ER DB hold  #5 3x30\"

## 2023-07-18 ENCOUNTER — HOSPITAL ENCOUNTER (OUTPATIENT)
Dept: PHYSICAL THERAPY | Facility: HOSPITAL | Age: 63
Setting detail: THERAPIES SERIES
Discharge: HOME | End: 2023-07-18
Payer: COMMERCIAL

## 2023-07-18 PROCEDURE — 97140 MANUAL THERAPY 1/> REGIONS: CPT

## 2023-07-18 PROCEDURE — 97110 THERAPEUTIC EXERCISES: CPT

## 2023-07-18 NOTE — FLOWSHEET NOTE
Physical Therapy Daily Treatment Note  Date:  2023    TIme In:  5765                   Time Out: [address] [facility]    Patient Name:  Swati Olsen    :  1960  MRN: [id number]    Restrictions/Precautions:    Pertinent Medical History:  Medical/Treatment Diagnosis Information:  Acute pain of right shoulder [M79.156]     Insurance/Certification information:  Payor: [address] [place] [id number] / Plan: [address] [place] [address] / Product Type: *No Product type* /   Physician Information:  Yisel Flor  Plan of care signed (Y/N):    Visit# / total visits:     11 /    G-Code (if applicable):      Date / Visit # G-Code Applied:         Progress Note: []  Yes  [x]  No  Next due by: 23       Pain level: 0/10    Subjective: Pt states his shoulder is doing well today; no issues since last visit earlier this week. Objective:  Observation:   Test measurements:          General AROM UE: AROM Assessed  AROM RUE (degrees)  R Shoulder Flexion (0-180): 0-170  R Shoulder ABduction (0-180): 0-166  R Shoulder Int Rotation  (0-70): 0-73  R Shoulder Ext Rotation (0-90): 0-59  Strength RUE  R Shoulder Flexion: 5/5  R Shoulder ABduction: 4+/5  R Shoulder External Rotation: 5/5  R Shoulder Horizontal ABduction: 5/5  Pt has 5-6/10 pain when performing cooking, dishes.   Quick Dash 20%  Palpation: 0/10 pain with Supraspinatus palpation    Exercises:  Exercise Resistance/Repetitions Date performed   UBE 2' fwd 2' bwd level 5.0 18   Pulleys x3' 18   Pendulums  CW x20 CCW x20 18   Doorway pec stretch  3x45\" 18   Rows 3x10 2\" CC #17.5 18   Shoulder ER neutral  3x10 2\" CC #17.5 18   Shoulder IR neutral  3x10 2\" CC #17.5 18   Shoulder ER in horz abduction 2x10 2\" CC 17.5lbs 18   Shoulder IR in horz abduction 2x10 2\" CC 17.5lbs 18   Shoulder flexion  #2 1x25 DB 18   shoulder abduction  #2 1x25 DB 18   Low trap setting  3x10 OTB  18   Sidelying ER  2x10 2\" #2 DB 18        T's 2x10 #2 18   I's 2x10 #2 18   Y's 2x10 #2 18   Supine ER DB hold  #5

## 2023-07-21 ENCOUNTER — HOSPITAL ENCOUNTER (OUTPATIENT)
Dept: PHYSICAL THERAPY | Facility: HOSPITAL | Age: 63
Setting detail: THERAPIES SERIES
Discharge: HOME | End: 2023-07-21
Payer: COMMERCIAL

## 2023-07-21 PROCEDURE — 97140 MANUAL THERAPY 1/> REGIONS: CPT

## 2023-07-21 PROCEDURE — 97110 THERAPEUTIC EXERCISES: CPT

## 2023-07-21 NOTE — FLOWSHEET NOTE
Other Therapeutic Activities:       Manual Treatments:    PROM into abd, flexion, ER, ER mobs grade IV, TFM to Supraspinatus tendon, and STM to L pec applied biofreeze x15'     Modalities:        Timed Code Treatment Minutes:  55      Total Treatment Minutes:  57    GOALS   Patient Goal(s): To be able to lift objects without pain  Short Term Goals Completed by 4 weeks Goal Status   Pt to be I with HEP MET   Pt to improve RUE shoulder AROM flexion to 0-165 or greater MET   Pt to improve RUE shoulder AROM abduction to 0-165 or greater MET   Pt to verbalize a 50% improvement in shoulder pain symptoms MET                                 Long Term Goals Completed by 8 weeks Goal Status   Pt to improve quick dash score to 10% or less Progressing    Pt to have 0/10 pain with palpation to supraspinatus tendon MET   Pt to improve RUE gross str to 5/5 MET   Pt to demonstrate proper postural awareness Progressing    Pt to have 0/10 pain in shoulder while performing cooking and dishes NEW   Pt to improve LUE AROM ER to 0-70 NEW       Treatment/Activity Tolerance:  [x] Patient tolerated treatment well [] Patient limited by fatigue  [] Patient limited by pain  [] Patient limited by other medical complications  [x] Other: Pt demonstrates an 8 deg increase in ER AAROM as compared to last assessment. Pt continues to progress well per POC. Plan to re-assess next week.       Pain after treatment:    0/10    Prognosis: [x] Good [] Fair  [] Poor    Patient Requires Follow-up: [x] Yes  [] No    Plan:   [x] Continue per plan of care [] Alter current plan (see comments)  [] Plan of care initiated [] Hold pending MD visit [] Discharge    Plan for Next Session:        Electronically signed by:  Jason Schaefer PT      .

## 2023-07-24 ENCOUNTER — HOSPITAL ENCOUNTER (OUTPATIENT)
Dept: PHYSICAL THERAPY | Facility: HOSPITAL | Age: 63
Setting detail: THERAPIES SERIES
Discharge: HOME | End: 2023-07-24
Payer: COMMERCIAL

## 2023-07-24 PROCEDURE — 97110 THERAPEUTIC EXERCISES: CPT

## 2023-07-24 PROCEDURE — 97140 MANUAL THERAPY 1/> REGIONS: CPT

## 2023-07-24 NOTE — FLOWSHEET NOTE
Physical Therapy Daily Treatment Note  Date:  2023    TIme In:  6756                  Time Out: 0930    Patient Name:  Yojana Evans    :  1960  MRN: [id number]    Restrictions/Precautions:    Pertinent Medical History:  Medical/Treatment Diagnosis Information:  Acute pain of right shoulder [C88.188]     Insurance/Certification information:  Payor: [address] [place] [address] / Plan: [address] [place] [address] / Product Type: *No Product type* /   Physician Information:  Rayma Form  Plan of care signed (Y/N):    Visit# / total visits:     15 /    G-Code (if applicable):      Date / Visit # G-Code Applied:         Progress Note: []  Yes  [x]  No  Next due by: 23       Pain level: 0/10    Subjective: Pt states his shoulder did not hurt this weekend after doing chores in the house end of last week    Objective:  Observation:   Test measurements:          General AROM UE: AROM Assessed  AROM RUE (degrees)  R Shoulder Flexion (0-180): 0-170  R Shoulder ABduction (0-180): 0-166  R Shoulder Int Rotation  (0-70): 0-73  R Shoulder Ext Rotation (0-90): 0-67  Strength RUE  R Shoulder Flexion: 5/5  R Shoulder ABduction: 4+/5  R Shoulder External Rotation: 5/5  R Shoulder Horizontal ABduction: 5/5  Pt has 5-6/10 pain when performing cooking, dishes.   Quick Dash 20%  Palpation: 0/10 pain with Supraspinatus palpation    Exercises:  Exercise Resistance/Repetitions Date performed   UBE 2' fwd 2' bwd level 5.0 24   Pulleys x3' 24   Pendulums  CW x20 CCW x20 24   Doorway pec stretch  3x45\" 24   Rows 3x10 2\" CC #17.5 24   Shoulder ER neutral  3x10 2\" CC #17.5 24   Shoulder IR neutral  3x10 2\" CC #17.5 24   Shoulder ER in horz abduction 2x10 2\" CC 17.5lbs 24   Shoulder IR in horz abduction 2x10 2\" CC 17.5lbs 24   Shoulder flexion  #2 1x25 DB 24   shoulder abduction  #2 1x25 DB 24   Low trap setting  3x10 OTB  24   Horz abd ER Press x10 24   Sidelying ER  2x10 2\" #2 DB 24        T's 2x10 #2 24   I's 2x10 #2 24   Y's

## 2023-07-25 ENCOUNTER — APPOINTMENT (OUTPATIENT)
Dept: PHYSICAL THERAPY | Facility: HOSPITAL | Age: 63
End: 2023-07-25
Payer: COMMERCIAL

## 2023-07-26 ENCOUNTER — HOSPITAL ENCOUNTER (OUTPATIENT)
Dept: PHYSICAL THERAPY | Facility: HOSPITAL | Age: 63
Setting detail: THERAPIES SERIES
Discharge: HOME | End: 2023-07-26
Payer: COMMERCIAL

## 2023-07-26 PROCEDURE — 97110 THERAPEUTIC EXERCISES: CPT

## 2023-07-26 PROCEDURE — 97140 MANUAL THERAPY 1/> REGIONS: CPT

## 2023-07-26 NOTE — FLOWSHEET NOTE
Other Therapeutic Activities:       Manual Treatments:    PROM into abd, flexion, ER, ER mobs grade IV, TFM to Supraspinatus tendon, and STM to L pec applied biofreeze x17'     Modalities:        Timed Code Treatment Minutes:  55      Total Treatment Minutes:  60    GOALS   Patient Goal(s): To be able to lift objects without pain  Short Term Goals Completed by 4 weeks Goal Status   Pt to be I with HEP MET   Pt to improve RUE shoulder AROM flexion to 0-165 or greater MET   Pt to improve RUE shoulder AROM abduction to 0-165 or greater MET   Pt to verbalize a 50% improvement in shoulder pain symptoms MET                                 Long Term Goals Completed by 8 weeks Goal Status   Pt to improve quick dash score to 10% or less MET   Pt to have 0/10 pain with palpation to supraspinatus tendon MET   Pt to improve RUE gross str to 5/5 MET   Pt to demonstrate proper postural awareness MET   Pt to have 0/10 pain in shoulder while performing cooking and dishes MET   Pt to improve LUE AROM ER to 0-70 MET       Treatment/Activity Tolerance:  [x] Patient tolerated treatment well [] Patient limited by fatigue  [] Patient limited by pain  [] Patient limited by other medical complications  [x] Other: Pt progressed through all exercises with no pain and good form throughout. Significant improvement with ER during Manual. Pt reassessed this reporting period with significant improvements overall. Pt has met all goals as established in POC for pt strength, pain, ROM, functional scores, posture and I with HEP. Pt is ready for d/c and skilled PT is no longer necessary at this time.      Pain after treatment:    0/10    Prognosis: [x] Good [] Fair  [] Poor    Patient Requires Follow-up: [] Yes  [x] No    Plan:   [] Continue per plan of care [] Alter current plan (see comments)  [] Plan of care initiated [] Hold pending MD visit [x] Discharge    Plan for Next Session:        Electronically signed by:  Mary Walters PT      .

## 2023-09-12 ENCOUNTER — HOSPITAL ENCOUNTER (OUTPATIENT)
Dept: CT IMAGING | Facility: HOSPITAL | Age: 63
Discharge: HOME | End: 2023-09-12
Payer: COMMERCIAL

## 2023-09-12 DIAGNOSIS — F17.200 TOBACCO USE DISORDER, MILD, ABUSE: ICD-10-CM

## 2023-09-12 PROCEDURE — 71271 CT THORAX LUNG CANCER SCR C-: CPT

## 2023-11-14 ENCOUNTER — OFFICE VISIT (OUTPATIENT)
Dept: NEUROLOGY | Facility: CLINIC | Age: 63
End: 2023-11-14
Payer: COMMERCIAL

## 2023-11-14 VITALS
SYSTOLIC BLOOD PRESSURE: 152 MMHG | DIASTOLIC BLOOD PRESSURE: 84 MMHG | OXYGEN SATURATION: 95 % | BODY MASS INDEX: 42.66 KG/M2 | WEIGHT: 315 LBS | HEART RATE: 80 BPM | HEIGHT: 72 IN | TEMPERATURE: 96.8 F

## 2023-11-14 DIAGNOSIS — G47.33 OBSTRUCTIVE SLEEP APNEA: Primary | ICD-10-CM

## 2023-11-14 PROCEDURE — 1159F MED LIST DOCD IN RCRD: CPT | Performed by: NURSE PRACTITIONER

## 2023-11-14 PROCEDURE — 1160F RVW MEDS BY RX/DR IN RCRD: CPT | Performed by: NURSE PRACTITIONER

## 2023-11-14 PROCEDURE — 99213 OFFICE O/P EST LOW 20 MIN: CPT | Performed by: NURSE PRACTITIONER

## 2023-11-14 RX ORDER — CHOLECALCIFEROL (VITAMIN D3) 25 MCG
TABLET ORAL
COMMUNITY
Start: 2023-10-23

## 2023-11-14 NOTE — PROGRESS NOTES
Follow up Sleep Patient Office Visit      Patient Name: Tim Figueroa  : 1960   MRN: [id number]     Referring Physician: No ref. provider found    Chief Complaint:    Chief Complaint   Patient presents with    Follow-up     Patient in office to follow up on lian.       History of Present Illness: Tim Figueroa is a 63 y.o. male who is here today to follow up with LIAN.  Currently on AutoPap 10-20cm, average P95 pressre 11.3cm, compliance 93%, AHI 1.9/hour.  He is using a full face mask, regular tubing and [employer] [facility].  He has had symptomatic improvement with the initiation of AutoPap therapy.  He has no questions or concerns today and says he is doing very well.      Pertinent Medical History:   Current compliance report reviewed and has been scanned into the patient's medical record.    Subjective      Review of Systems:   Review of Systems    Medications:     Current Outpatient Medications:     amLODIPine (NORVASC) 10 MG tablet, Take 1 tablet by mouth Daily., Disp: , Rfl:     carvedilol (COREG) 3.125 MG tablet, Take 3.12 mg by mouth 2 (Two) Times a Day., Disp: , Rfl:     celecoxib (CeleBREX) 100 MG capsule, Take 1 capsule by mouth 2 (Two) Times a Day., Disp: , Rfl:     chlorthalidone (HYGROTON) 25 MG tablet, Take 1 tablet by mouth Daily., Disp: , Rfl:     cholecalciferol 25 MCG tablet, , Disp: , Rfl:     famotidine (PEPCID) 40 MG tablet, Take 1 tablet by mouth Daily., Disp: , Rfl:     loratadine (CLARITIN) 10 MG tablet, Take 1 tablet by mouth Daily., Disp: , Rfl:     losartan (COZAAR) 100 MG tablet, Take 1 tablet by mouth Daily., Disp: , Rfl:     potassium chloride (K-DUR,KLOR-CON) 20 MEQ CR tablet, , Disp: , Rfl:     potassium chloride (KLOR-CON) 20 MEQ packet, Take 20 mEq by mouth Daily., Disp: , Rfl:     pravastatin (PRAVACHOL) 40 MG tablet, Take 1 tablet by mouth Daily., Disp: , Rfl:     tamsulosin (FLOMAX) 0.4 MG capsule 24 hr capsule, Take 1 capsule by mouth Daily., Disp: , Rfl:  "    tiZANidine (ZANAFLEX) 4 MG tablet, Take 1 tablet by mouth 2 (Two) Times a Day., Disp: , Rfl:     vitamin D (ERGOCALCIFEROL) 1.25 MG (90567 UT) capsule capsule, Take 1,250 Units by mouth 1 (One) Time Per Week., Disp: , Rfl:     Allergies:   No Known Allergies    Objective     Physical Exam:  Vital Signs:   Vitals:    11/14/23 0813   BP: 152/84   BP Location: Right arm   Patient Position: Sitting   Cuff Size: Adult   Pulse: 80   Temp: 96.8 °F (36 °C)   SpO2: 95%   Weight: (!) 152 kg (336 lb)   Height: 182.9 cm (72\")   PainSc: 0-No pain     BMI: Body mass index is 45.57 kg/m².    Physical Exam  Vitals and nursing note reviewed.   Constitutional:       General: He is not in acute distress.     Appearance: He is well-developed. He is obese. He is not diaphoretic.   HENT:      Head: Normocephalic and atraumatic.   Eyes:      Extraocular Movements: Extraocular movements intact.      Conjunctiva/sclera: Conjunctivae normal.   Pulmonary:      Effort: Pulmonary effort is normal. No respiratory distress.   Musculoskeletal:         General: Normal range of motion.   Skin:     General: Skin is warm and dry.   Neurological:      Mental Status: He is alert and oriented to person, place, and time.   Psychiatric:         Mood and Affect: Mood normal.         Behavior: Behavior normal.         Thought Content: Thought content normal.         Judgment: Judgment normal.         Assessment / Plan      Assessment/Plan:   Diagnoses and all orders for this visit:    1. Obstructive sleep apnea (Primary)    2. BMI 45.0-49.9, adult    *Order for supplies sent to [facility] via [facility].   *Advised patient to avoid driving if drowsy.     Follow Up:   Return in about 1 year (around 11/14/2024) for Follow Up.    I have advised the patient the need to continue the use of CPAP.  Gold standard for treatment of sleep apnea includes weight loss, use of cpap and avoidance of alcohol.  Untreated ALMAZ may increase the risk for development of " hypertension, stroke, myocardial infarction, diabetes, cardiovascular disease, work-related issues and driving accidents. I have counseled and advised the patient to avoid driving or operating heavy/dangerous equipment if feeling drowsy.     IRAIDA Ham, FNP-C  [facility]

## 2024-08-13 ENCOUNTER — TRANSCRIBE ORDERS (OUTPATIENT)
Dept: ADMINISTRATIVE | Age: 64
End: 2024-08-13

## 2024-08-13 DIAGNOSIS — Z12.31 SCREENING MAMMOGRAM, ENCOUNTER FOR: Primary | ICD-10-CM

## 2024-09-23 ENCOUNTER — TRANSCRIBE ORDERS (OUTPATIENT)
Dept: GENERAL RADIOLOGY | Facility: HOSPITAL | Age: 64
End: 2024-09-23

## 2024-09-23 DIAGNOSIS — Z87.891 FORMER CIGARETTE SMOKER: Primary | ICD-10-CM

## 2024-10-23 ENCOUNTER — HOSPITAL ENCOUNTER (OUTPATIENT)
Dept: CT IMAGING | Facility: HOSPITAL | Age: 64
Discharge: HOME | End: 2024-10-23
Payer: COMMERCIAL

## 2024-10-23 DIAGNOSIS — Z87.891 FORMER CIGARETTE SMOKER: ICD-10-CM

## 2024-10-23 PROCEDURE — 71271 CT THORAX LUNG CANCER SCR C-: CPT

## 2024-11-12 ENCOUNTER — OFFICE VISIT (OUTPATIENT)
Age: 64
End: 2024-11-12
Payer: COMMERCIAL

## 2024-11-12 VITALS
TEMPERATURE: 98 F | SYSTOLIC BLOOD PRESSURE: 146 MMHG | BODY MASS INDEX: 45.56 KG/M2 | RESPIRATION RATE: 14 BRPM | OXYGEN SATURATION: 96 % | HEIGHT: 72 IN | HEART RATE: 70 BPM | DIASTOLIC BLOOD PRESSURE: 72 MMHG

## 2024-11-12 DIAGNOSIS — G47.33 OBSTRUCTIVE SLEEP APNEA: Primary | ICD-10-CM

## 2024-11-12 PROCEDURE — 99214 OFFICE O/P EST MOD 30 MIN: CPT | Performed by: NURSE PRACTITIONER

## 2025-05-20 ENCOUNTER — TRANSCRIBE ORDERS (OUTPATIENT)
Dept: GENERAL RADIOLOGY | Facility: HOSPITAL | Age: 65
End: 2025-05-20

## 2025-05-20 DIAGNOSIS — R91.8 LUNG NODULES: Primary | ICD-10-CM

## 2025-05-28 ENCOUNTER — HOSPITAL ENCOUNTER (OUTPATIENT)
Dept: CT IMAGING | Facility: HOSPITAL | Age: 65
Discharge: HOME | End: 2025-05-28
Payer: COMMERCIAL

## 2025-05-28 DIAGNOSIS — R91.8 LUNG NODULES: ICD-10-CM

## 2025-05-28 PROCEDURE — 71250 CT THORAX DX C-: CPT

## 2025-06-27 ENCOUNTER — TELEPHONE (OUTPATIENT)
Dept: SURGERY | Facility: CLINIC | Age: 65
End: 2025-06-27
Payer: COMMERCIAL

## 2025-06-27 NOTE — TELEPHONE ENCOUNTER
Tried to reach the pt regarding a colonoscopy referral we received from Mary Coleman-phone continuously rings, did not give an option to leave a VM-Will try again to reach the pt to go over the colonoscopy screening questions. 1st attempt.     *PT'S LAST COLON WITH DR. WALL WAS ON 09/10/2015*    *IF PT RETURNS THIS CALL, PLEASE SEND THE CALL TO THE OFFICE. ASK FOR KALYANI*

## 2025-07-02 DIAGNOSIS — Z12.11 COLON CANCER SCREENING: Primary | ICD-10-CM

## 2025-07-02 RX ORDER — BISACODYL 5 MG
TABLET, DELAYED RELEASE (ENTERIC COATED) ORAL
Qty: 4 TABLET | Refills: 0 | Status: SHIPPED | OUTPATIENT
Start: 2025-07-02

## 2025-07-02 RX ORDER — POLYETHYLENE GLYCOL 3350 17 G/17G
238 POWDER, FOR SOLUTION ORAL DAILY
Qty: 238 G | Refills: 0 | Status: SHIPPED | OUTPATIENT
Start: 2025-07-02 | End: 2025-07-03

## 2025-07-03 ENCOUNTER — PREP FOR SURGERY (OUTPATIENT)
Dept: OTHER | Facility: HOSPITAL | Age: 65
End: 2025-07-03
Payer: COMMERCIAL

## 2025-07-03 DIAGNOSIS — Z12.11 SCREENING FOR COLON CANCER: Primary | ICD-10-CM

## 2025-07-03 RX ORDER — SODIUM CHLORIDE, SODIUM LACTATE, POTASSIUM CHLORIDE, CALCIUM CHLORIDE 600; 310; 30; 20 MG/100ML; MG/100ML; MG/100ML; MG/100ML
50 INJECTION, SOLUTION INTRAVENOUS CONTINUOUS
OUTPATIENT
Start: 2025-07-03 | End: 2025-07-04

## 2025-07-03 RX ORDER — SODIUM CHLORIDE 0.9 % (FLUSH) 0.9 %
10 SYRINGE (ML) INJECTION AS NEEDED
OUTPATIENT
Start: 2025-07-03